# Patient Record
Sex: FEMALE | Race: WHITE | NOT HISPANIC OR LATINO | Employment: OTHER | URBAN - METROPOLITAN AREA
[De-identification: names, ages, dates, MRNs, and addresses within clinical notes are randomized per-mention and may not be internally consistent; named-entity substitution may affect disease eponyms.]

---

## 2022-11-22 ENCOUNTER — OFFICE VISIT (OUTPATIENT)
Dept: PULMONOLOGY | Facility: CLINIC | Age: 62
End: 2022-11-22
Payer: COMMERCIAL

## 2022-11-22 VITALS
BODY MASS INDEX: 25.47 KG/M2 | RESPIRATION RATE: 17 BRPM | WEIGHT: 158.5 LBS | OXYGEN SATURATION: 95 % | SYSTOLIC BLOOD PRESSURE: 120 MMHG | HEIGHT: 66 IN | HEART RATE: 85 BPM | DIASTOLIC BLOOD PRESSURE: 70 MMHG

## 2022-11-22 DIAGNOSIS — J44.89 ASTHMA WITH COPD: Primary | ICD-10-CM

## 2022-11-22 DIAGNOSIS — Z23 NEED FOR 23-POLYVALENT PNEUMOCOCCAL POLYSACCHARIDE VACCINE: ICD-10-CM

## 2022-11-22 DIAGNOSIS — G47.33 OSA ON CPAP: ICD-10-CM

## 2022-11-22 DIAGNOSIS — Z79.899 LONG-TERM USE OF PLAQUENIL: ICD-10-CM

## 2022-11-22 DIAGNOSIS — Z87.891 STOPPED SMOKING WITH GREATER THAN 30 PACK YEAR HISTORY: ICD-10-CM

## 2022-11-22 DIAGNOSIS — M32.9 SYSTEMIC LUPUS ERYTHEMATOSUS, UNSPECIFIED SLE TYPE, UNSPECIFIED ORGAN INVOLVEMENT STATUS: ICD-10-CM

## 2022-11-22 DIAGNOSIS — Z23 NEED FOR IMMUNIZATION AGAINST INFLUENZA: ICD-10-CM

## 2022-11-22 PROCEDURE — 3008F PR BODY MASS INDEX (BMI) DOCUMENTED: ICD-10-PCS | Mod: CPTII,S$GLB,, | Performed by: INTERNAL MEDICINE

## 2022-11-22 PROCEDURE — 90686 FLU VACCINE (QUAD) GREATER THAN OR EQUAL TO 3YO PRESERVATIVE FREE IM: ICD-10-PCS | Mod: S$GLB,,, | Performed by: INTERNAL MEDICINE

## 2022-11-22 PROCEDURE — 90732 PPSV23 VACC 2 YRS+ SUBQ/IM: CPT | Mod: S$GLB,,, | Performed by: INTERNAL MEDICINE

## 2022-11-22 PROCEDURE — 3078F DIAST BP <80 MM HG: CPT | Mod: CPTII,S$GLB,, | Performed by: INTERNAL MEDICINE

## 2022-11-22 PROCEDURE — 3074F SYST BP LT 130 MM HG: CPT | Mod: CPTII,S$GLB,, | Performed by: INTERNAL MEDICINE

## 2022-11-22 PROCEDURE — 99204 OFFICE O/P NEW MOD 45 MIN: CPT | Mod: 25,S$GLB,, | Performed by: INTERNAL MEDICINE

## 2022-11-22 PROCEDURE — 90471 FLU VACCINE (QUAD) GREATER THAN OR EQUAL TO 3YO PRESERVATIVE FREE IM: ICD-10-PCS | Mod: S$GLB,,, | Performed by: INTERNAL MEDICINE

## 2022-11-22 PROCEDURE — 3078F PR MOST RECENT DIASTOLIC BLOOD PRESSURE < 80 MM HG: ICD-10-PCS | Mod: CPTII,S$GLB,, | Performed by: INTERNAL MEDICINE

## 2022-11-22 PROCEDURE — 3008F BODY MASS INDEX DOCD: CPT | Mod: CPTII,S$GLB,, | Performed by: INTERNAL MEDICINE

## 2022-11-22 PROCEDURE — 99204 PR OFFICE/OUTPT VISIT, NEW, LEVL IV, 45-59 MIN: ICD-10-PCS | Mod: 25,S$GLB,, | Performed by: INTERNAL MEDICINE

## 2022-11-22 PROCEDURE — 3074F PR MOST RECENT SYSTOLIC BLOOD PRESSURE < 130 MM HG: ICD-10-PCS | Mod: CPTII,S$GLB,, | Performed by: INTERNAL MEDICINE

## 2022-11-22 PROCEDURE — 99999 PR PBB SHADOW E&M-NEW PATIENT-LVL IV: CPT | Mod: PBBFAC,,, | Performed by: INTERNAL MEDICINE

## 2022-11-22 PROCEDURE — 99999 PR PBB SHADOW E&M-NEW PATIENT-LVL IV: ICD-10-PCS | Mod: PBBFAC,,, | Performed by: INTERNAL MEDICINE

## 2022-11-22 PROCEDURE — 1159F PR MEDICATION LIST DOCUMENTED IN MEDICAL RECORD: ICD-10-PCS | Mod: CPTII,S$GLB,, | Performed by: INTERNAL MEDICINE

## 2022-11-22 PROCEDURE — 90472 PNEUMOCOCCAL POLYSACCHARIDE VACCINE 23-VALENT =>2YO SQ IM: ICD-10-PCS | Mod: S$GLB,,, | Performed by: INTERNAL MEDICINE

## 2022-11-22 PROCEDURE — 90686 IIV4 VACC NO PRSV 0.5 ML IM: CPT | Mod: S$GLB,,, | Performed by: INTERNAL MEDICINE

## 2022-11-22 PROCEDURE — 90472 IMMUNIZATION ADMIN EACH ADD: CPT | Mod: S$GLB,,, | Performed by: INTERNAL MEDICINE

## 2022-11-22 PROCEDURE — 1159F MED LIST DOCD IN RCRD: CPT | Mod: CPTII,S$GLB,, | Performed by: INTERNAL MEDICINE

## 2022-11-22 PROCEDURE — 90471 IMMUNIZATION ADMIN: CPT | Mod: S$GLB,,, | Performed by: INTERNAL MEDICINE

## 2022-11-22 PROCEDURE — 90732 PNEUMOCOCCAL POLYSACCHARIDE VACCINE 23-VALENT =>2YO SQ IM: ICD-10-PCS | Mod: S$GLB,,, | Performed by: INTERNAL MEDICINE

## 2022-11-22 RX ORDER — BUDESONIDE AND FORMOTEROL FUMARATE DIHYDRATE 160; 4.5 UG/1; UG/1
AEROSOL RESPIRATORY (INHALATION)
COMMUNITY
Start: 2022-11-07 | End: 2023-02-21 | Stop reason: SDUPTHER

## 2022-11-22 RX ORDER — OMEPRAZOLE 40 MG/1
40 CAPSULE, DELAYED RELEASE ORAL DAILY
COMMUNITY
Start: 2022-11-19

## 2022-11-22 RX ORDER — ATORVASTATIN CALCIUM 10 MG/1
TABLET, FILM COATED ORAL
COMMUNITY
Start: 2022-11-07

## 2022-11-22 RX ORDER — TRAMADOL HYDROCHLORIDE 50 MG/1
50 TABLET ORAL
COMMUNITY
Start: 2022-10-27

## 2022-11-22 RX ORDER — IBUPROFEN 600 MG/1
TABLET ORAL
COMMUNITY

## 2022-11-22 RX ORDER — CARVEDILOL 12.5 MG/1
12.5 TABLET ORAL 2 TIMES DAILY
COMMUNITY
Start: 2022-10-27

## 2022-11-22 RX ORDER — ALBUTEROL SULFATE 90 UG/1
AEROSOL, METERED RESPIRATORY (INHALATION)
COMMUNITY
Start: 2022-10-27 | End: 2023-02-21 | Stop reason: SDUPTHER

## 2022-11-22 RX ORDER — CYCLOBENZAPRINE HCL 10 MG
10 TABLET ORAL NIGHTLY
COMMUNITY
Start: 2022-11-20

## 2022-11-22 RX ORDER — HYDROXYCHLOROQUINE SULFATE 200 MG/1
200 TABLET, FILM COATED ORAL 2 TIMES DAILY
COMMUNITY
Start: 2022-11-16

## 2022-11-22 NOTE — PROGRESS NOTES
Initial Outpatient Pulmonary Evaluation       SUBJECTIVE:     Chief Complaint   Patient presents with    Asthma       History of Present Illness:    Patient is a 62 y.o. female presents to establish care for asthma with COPD, nocturnal hypoxemia, multiple lung nodules.  Used to follow with pulmonology Dr.Hafiza [facility].      Due for CT scan of the chest 6 months from 2022 as per last CT recommendation.    On albuterol p.r.n. and Symbicort 160 mcg 2 puffs twice daily.  Asthma control test questionnaire 18.    Lupus on plaquenil x      Quit smoking  > 30 pack-year smoking history.      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 Negative for fever and chills.   HENT:  Negative for nosebleeds.    Eyes:  Negative for redness.   Respiratory:  Positive for apnea, snoring, cough, shortness of breath, wheezing, dyspnea on extertion and use of rescue inhaler. Negative for choking.    Cardiovascular:  Negative for leg swelling.   Genitourinary:  Negative for hematuria.   Endocrine:  Negative for cold intolerance.    Musculoskeletal:  Negative for gait problem.        Systemic lupus   Gastrointestinal:  Negative for vomiting.   Neurological:  Negative for syncope.   Hematological:  Negative for adenopathy.   Psychiatric/Behavioral:  Positive for sleep disturbance. Negative for confusion.      Review of patient's allergies indicates:   Allergen Reactions    Hydrochlorothiazide Hives and Shortness Of Breath    Pilocarpine Hives, Itching, Rash and Swelling    Sulfa (sulfonamide antibiotics) Hives, Itching and Rash       Current Outpatient Medications   Medication Sig Dispense Refill    albuterol (PROVENTIL/VENTOLIN HFA) 90 mcg/actuation inhaler SMARTSI Puff(s) Via Inhaler PRN      atorvastatin (LIPITOR) 10 MG tablet SMARTSI Tablet(s) By Mouth Every Evening       "carvediloL (COREG) 12.5 MG tablet Take 12.5 mg by mouth 2 (two) times daily.      cyclobenzaprine (FLEXERIL) 10 MG tablet Take 10 mg by mouth every evening.      hydrOXYchloroQUINE (PLAQUENIL) 200 mg tablet Take 200 mg by mouth 2 (two) times daily.      ibuprofen (ADVIL,MOTRIN) 600 MG tablet       omeprazole (PRILOSEC) 40 MG capsule Take 40 mg by mouth once daily.      SYMBICORT 160-4.5 mcg/actuation HFAA Inhale into the lungs.      traMADoL (ULTRAM) 50 mg tablet Take 50 mg by mouth as needed.       No current facility-administered medications for this visit.       Past Medical History:   Diagnosis Date    Asthma     Hypertension      History reviewed. No pertinent surgical history.  Family History   Problem Relation Age of Onset    Hypertension Mother     Heart failure Mother     Hypertension Father     Diabetes Father     Parkinsonism Father      Social History     Tobacco Use    Smoking status: Former     Years: 35.00     Types: Cigarettes    Smokeless tobacco: Never   Substance Use Topics    Alcohol use: Not Currently    Drug use: Never          OBJECTIVE:     Vital Signs (Most Recent)  Vital Signs  Pulse: 85  Resp: 17  SpO2: 95 %  BP: 120/70  Height and Weight  Height: 5' 6" (167.6 cm)  Weight: 71.9 kg (158 lb 8.2 oz)  BSA (Calculated - sq m): 1.83 sq meters  BMI (Calculated): 25.6  Weight in (lb) to have BMI = 25: 154.6]  Wt Readings from Last 2 Encounters:   11/22/22 71.9 kg (158 lb 8.2 oz)         Physical Exam:  Physical Exam   Constitutional: She is oriented to person, place, and time. She appears well-developed and well-nourished. No distress.   HENT:   Head: Normocephalic.   Cardiovascular: Normal rate and regular rhythm.   Pulmonary/Chest: Normal expansion, hyperinflation and effort normal. No stridor. No respiratory distress. She has decreased breath sounds. She exhibits no tenderness.   Abdominal: She exhibits no distension.   Musculoskeletal:         General: No tenderness.      Cervical back: Neck " supple.   Lymphadenopathy:     She has no cervical adenopathy.   Neurological: She is alert and oriented to person, place, and time. Gait normal.   Skin: Skin is warm. No cyanosis. Nails show no clubbing.   Psychiatric: She has a normal mood and affect. Her behavior is normal. Judgment and thought content normal.   Nursing note and vitals reviewed.    Laboratory  No results found for: WBC, RBC, HGB, HCT, MCV, MCH, MCHC, RDW, PLT, MPV, GRAN, LYMPH, MONO, EOS, BASO, EOSINOPHIL, BASOPHIL    BMP  No results found for: NA, K, CL, CO2, BUN, CREATININE, CALCIUM, ANIONGAP, ESTGFRAFRICA, EGFRNONAA, AST, ALT, PROT    No results found for: BNP    No results found for: TSH    No results found for: SEDRATE    No results found for: CRP    No results found for: IGE    No results found for: ASPERGILLUS  No results found for: AFUMIGATUSCL     No results found for: ACE    Diagnostic Results:  I have personally reviewed today the following studies :        ASSESSMENT/PLAN:     Asthma with COPD  -     Complete PFT without bronchodilator; Future  -     CT Chest Lung Screening Low Dose; Future; Expected date: 02/22/2023  -     Stress test, pulmonary; Future    Stopped smoking with greater than 30 pack year history  -     CT Chest Lung Screening Low Dose; Future; Expected date: 02/22/2023    Need for immunization against influenza  -     Influenza - Quadrivalent (PF)    Need for 23-polyvalent pneumococcal polysaccharide vaccine  -     Pneumococcal Polysaccharide Vaccine (23 Valent) (SQ/IM)    EDGAR on CPAP    Long-term use of Plaquenil    Systemic lupus erythematosus, unspecified SLE type, unspecified organ involvement status    Continue albuterol p.r.n. and Symbicort 160 mcg 2 puffs twice daily.      Check PFT with bronchodilator and 6 minute walk test.      Continue CPAP w nasal cushion and oxygen during sleep    Low-dose CT chest    Continue Plaquenil.  Further recommendations to follow up of workup    Further recommendation to follow  above workup      Follow up in about 3 months (around 2/22/2023).    This note was prepared using voice recognition system and is likely to have sound alike errors that may have been overlooked even after proof reading.  Please call me with any questions    Discussed diagnosis, its evaluation, treatment and usual course. All questions answered.    Thank you for the courtesy of participating in the care of this patient    Cathryn Schenider MD

## 2022-12-07 ENCOUNTER — CLINICAL SUPPORT (OUTPATIENT)
Dept: PULMONOLOGY | Facility: CLINIC | Age: 62
End: 2022-12-07
Payer: MEDICARE

## 2022-12-07 ENCOUNTER — HOSPITAL ENCOUNTER (OUTPATIENT)
Dept: RADIOLOGY | Facility: HOSPITAL | Age: 62
Discharge: HOME | End: 2022-12-07
Attending: INTERNAL MEDICINE
Payer: MEDICARE

## 2022-12-07 VITALS — WEIGHT: 157.19 LBS | HEIGHT: 66 IN | BODY MASS INDEX: 25.26 KG/M2

## 2022-12-07 DIAGNOSIS — J44.89 ASTHMA WITH COPD: ICD-10-CM

## 2022-12-07 DIAGNOSIS — Z87.891 STOPPED SMOKING WITH GREATER THAN 30 PACK YEAR HISTORY: ICD-10-CM

## 2022-12-07 LAB
BRPFT: ABNORMAL
DLCO ADJ PRE: 15.5 ML/(MIN*MMHG) (ref 18.19–29.66)
DLCO SINGLE BREATH LLN: 18.19
DLCO SINGLE BREATH PRE REF: 64.8 %
DLCO SINGLE BREATH REF: 23.93
DLCOC SBVA LLN: 3.14
DLCOC SBVA PRE REF: 111.7 %
DLCOC SBVA REF: 4.52
DLCOC SINGLE BREATH LLN: 18.19
DLCOC SINGLE BREATH PRE REF: 64.8 %
DLCOC SINGLE BREATH REF: 23.93
DLCOVA LLN: 3.14
DLCOVA PRE REF: 111.7 %
DLCOVA PRE: 5.05 ML/(MIN*MMHG*L) (ref 3.14–5.89)
DLCOVA REF: 4.52
DLVAADJ PRE: 5.05 ML/(MIN*MMHG*L) (ref 3.14–5.89)
ERV LLN: -16449.19
ERV PRE REF: 80.1 %
ERV REF: 0.81
FEF 25 75 LLN: 1.16
FEF 25 75 PRE REF: 85.5 %
FEF 25 75 REF: 2.3
FEV1 FVC LLN: 67
FEV1 FVC PRE REF: 94.6 %
FEV1 FVC REF: 79
FEV1 LLN: 1.97
FEV1 PRE REF: 93.7 %
FEV1 REF: 2.62
FRCPLETH LLN: 2
FRCPLETH PREREF: 115.5 %
FRCPLETH REF: 2.82
FVC LLN: 2.52
FVC PRE REF: 98.3 %
FVC REF: 3.35
IVC PRE: 1.78 L (ref 2.52–4.18)
IVC SINGLE BREATH LLN: 2.52
IVC SINGLE BREATH PRE REF: 53.1 %
IVC SINGLE BREATH REF: 3.35
MVV LLN: 85
MVV PRE REF: 27.9 %
MVV REF: 100
PEF LLN: 4.75
PEF PRE REF: 71.2 %
PEF REF: 6.57
PRE DLCO: 15.5 ML/(MIN*MMHG) (ref 18.19–29.66)
PRE ERV: 0.65 L (ref -16449.19–16450.81)
PRE FEF 25 75: 1.96 L/S (ref 1.16–3.44)
PRE FET 100: 9.7 SEC
PRE FEV1 FVC: 74.58 % (ref 66.67–91.03)
PRE FEV1: 2.46 L (ref 1.97–3.28)
PRE FRC PL: 3.26 L
PRE FVC: 3.3 L (ref 2.52–4.18)
PRE MVV: 28 L/MIN (ref 85.37–115.51)
PRE PEF: 4.68 L/S (ref 4.75–8.4)
PRE RV: 1.28 L (ref 1.44–2.59)
PRE TLC: 4.57 L (ref 4.31–6.29)
RAW LLN: 3.06
RAW PRE REF: 62.6 %
RAW PRE: 1.91 CMH2O*S/L (ref 3.06–3.06)
RAW REF: 3.06
RV LLN: 1.44
RV PRE REF: 63.3 %
RV REF: 2.02
RVTLC LLN: 30
RVTLC PRE REF: 70.3 %
RVTLC PRE: 27.92 % (ref 30.11–49.29)
RVTLC REF: 40
TLC LLN: 4.31
TLC PRE REF: 86.3 %
TLC REF: 5.3
VA PRE: 3.08 L (ref 5.15–5.15)
VA SINGLE BREATH LLN: 5.15
VA SINGLE BREATH PRE REF: 59.8 %
VA SINGLE BREATH REF: 5.15
VC LLN: 2.52
VC PRE REF: 98.3 %
VC PRE: 3.3 L (ref 2.52–4.18)
VC REF: 3.35
VTGRAWPRE: 3.67 L

## 2022-12-07 PROCEDURE — 94729 DIFFUSING CAPACITY: CPT | Mod: PBBFAC

## 2022-12-07 PROCEDURE — 99999 PR PBB SHADOW E&M-EST. PATIENT-LVL I: ICD-10-PCS | Mod: PBBFAC,,,

## 2022-12-07 PROCEDURE — 99211 OFF/OP EST MAY X REQ PHY/QHP: CPT | Mod: PBBFAC,25

## 2022-12-07 PROCEDURE — 94010 BREATHING CAPACITY TEST: CPT | Mod: PBBFAC

## 2022-12-07 PROCEDURE — 94729 PR C02/MEMBANE DIFFUSE CAPACITY: ICD-10-PCS | Mod: 26,S$PBB,, | Performed by: INTERNAL MEDICINE

## 2022-12-07 PROCEDURE — 71271 CT CHEST LUNG SCREENING LOW DOSE: ICD-10-PCS | Mod: 26,,, | Performed by: RADIOLOGY

## 2022-12-07 PROCEDURE — 94618 PULMONARY STRESS TESTING: CPT | Mod: 26,S$PBB,, | Performed by: INTERNAL MEDICINE

## 2022-12-07 PROCEDURE — 99999 PR PBB SHADOW E&M-EST. PATIENT-LVL I: CPT | Mod: PBBFAC,,,

## 2022-12-07 PROCEDURE — 71271 CT THORAX LUNG CANCER SCR C-: CPT | Mod: 26,,, | Performed by: RADIOLOGY

## 2022-12-07 PROCEDURE — 71271 CT THORAX LUNG CANCER SCR C-: CPT | Mod: TC

## 2022-12-07 PROCEDURE — 94729 DIFFUSING CAPACITY: CPT | Mod: 26,S$PBB,, | Performed by: INTERNAL MEDICINE

## 2022-12-07 PROCEDURE — 94726 PLETHYSMOGRAPHY LUNG VOLUMES: CPT | Mod: 26,S$PBB,, | Performed by: INTERNAL MEDICINE

## 2022-12-07 PROCEDURE — 94010 BREATHING CAPACITY TEST: ICD-10-PCS | Mod: 26,S$PBB,59, | Performed by: INTERNAL MEDICINE

## 2022-12-07 PROCEDURE — 94010 BREATHING CAPACITY TEST: CPT | Mod: 26,S$PBB,59, | Performed by: INTERNAL MEDICINE

## 2022-12-07 PROCEDURE — 94618 PULMONARY STRESS TESTING: ICD-10-PCS | Mod: 26,S$PBB,, | Performed by: INTERNAL MEDICINE

## 2022-12-07 PROCEDURE — 94726 PLETHYSMOGRAPHY LUNG VOLUMES: CPT | Mod: PBBFAC

## 2022-12-07 PROCEDURE — 94618 PULMONARY STRESS TESTING: CPT | Mod: PBBFAC

## 2022-12-07 PROCEDURE — 94726 PULM FUNCT TST PLETHYSMOGRAP: ICD-10-PCS | Mod: 26,S$PBB,, | Performed by: INTERNAL MEDICINE

## 2022-12-07 NOTE — PROCEDURES
"[facility]  Six Minute Walk     SUMMARY     Ordering Provider:    Interpreting Provider:   Performing nurse/tech/RT: BAYRON Pineda RRT  Diagnosis:  (Asthma with COPD)  Height: 5' 6" (167.6 cm)  Weight: 71.3 kg (157 lb 3 oz)  BMI (Calculated): 25.4   Patient Race:             Phase Oxygen Assessment Supplemental O2 Heart   Rate Blood Pressure Nick Dyspnea Scale Rating   Resting 97 % Room Air 64 bpm 126/58 1   Exercise        Minute        1 97 % Room Air 83 bpm     2 96 % Room Air 86 bpm     3 97 % Room Air 87 bpm     4 97 % Room Air 87 bpm     5 96 % Room Air 89 bpm     6  97 % Room Air 84 bpm 119/54 1   Recovery        Minute        1 98 % Room Air 77 bpm     2 98 % Room Air 68 bpm     3 97 % Room Air 68 bpm     4 98 % Room Air 65 bpm 118/51 0     Six Minute Walk Summary  6MWT Status: completed without stopping  Patient Reported: Dyspnea, Leg pain (Back Pain)     Interpretation:  Did the patient stop or pause?: No                                         Total Time Walked (Calculated): 360 seconds  Final Partial Lap Distance (feet): 100 feet  Total Distance Meters (Calculated): 396.24 meters  Predicted Distance Meters (Calculated): 504.78 meters  Percentage of Predicted (Calculated): 78.5  Peak VO2 (Calculated): 15.87  Mets: 4.53  Has The Patient Had a Previous Six Minute Walk Test?: No       Previous 6MWT Results  Has The Patient Had a Previous Six Minute Walk Test?: No    "

## 2022-12-09 DIAGNOSIS — Z87.891 STOPPED SMOKING WITH GREATER THAN 30 PACK YEAR HISTORY: Primary | ICD-10-CM

## 2023-01-04 DIAGNOSIS — M54.12 RADICULOPATHY, CERVICAL: ICD-10-CM

## 2023-01-04 DIAGNOSIS — M54.16 RADICULOPATHY, LUMBAR REGION: Primary | ICD-10-CM

## 2023-01-05 ENCOUNTER — CLINICAL SUPPORT (OUTPATIENT)
Dept: REHABILITATION | Facility: HOSPITAL | Age: 63
End: 2023-01-05
Payer: MEDICARE

## 2023-01-05 DIAGNOSIS — M54.16 RADICULOPATHY, LUMBAR REGION: Primary | ICD-10-CM

## 2023-01-05 PROCEDURE — 97110 THERAPEUTIC EXERCISES: CPT

## 2023-01-05 PROCEDURE — 97162 PT EVAL MOD COMPLEX 30 MIN: CPT

## 2023-01-05 NOTE — PROGRESS NOTES
"[facility]  Physical Therapy Initial Evaluation    Name: Kendra Hodges  Clinic Number: [id number]    Therapy Diagnosis:Low back and neck pain, core weakness, decreased functional ROM  Physician: Order, Paper    Physician Orders: Evaluate and treat; Bernice Back Program, Lumbar stabilization core strengthening, cervical stabilization  Medical Diagnosis from Referral: Radiculopathy, lumbar region, Radiculopathy, cervical region  Evaluation Date: 1/5/2023  Authorization Period Expiration: medically necessary  Plan of Care Expiration: 03/30/2023  Visit # / Visits authorized: medically necessary    Time In: 1045  Time Out: 1145  Total Appointment Time (timed & untimed codes): 45 minutes  Total Treatment time (time-based codes) separate from Evaluation: 15 minutes    Surgery: none  Orthopedic Precautions:  none  Pertinent History: Lupus    Subjective     Kendra reports: she started having lumbar and cervical pain back in 2012. She denies any injury but reports hard work over the years requiring a lot of bending, twisting, lifting and a lot of walking as a possible factor. Over time her pain became worse and in 2016 after reporting that she was hurting in all joints she was diagnosed with Lupus. Currently she reports that her back pain gives her more trouble than her neck pain pain. She used to like to go hiking but now is now longer able to because of her back and knees. Prolong walking makes her hurt so she is not able to walk much. She describes a "burning" pain across the low back that is mostly constant but varies in intensity depending on what she does. S\he will get spasms. If she does too much she will get sharp "jabbing" pain in her back. She will get radiation of pain into the L thigh most of the time but on rare occasions in the the R thigh. She will experience numbness and tingling again mostly the left leg but rare occasions the right. She also is experiencing constant neck pain " "that radiates into the left upper arm and shoulder. With ROM feels a "popping" in her neck. Denies and radiation of pain into the right arm. Will get numbness and tingling into both and she will wake up at night with numbness and tingling into both hands. She states sometimes changing positions with alleviate it. Her back though will keep her up at times. Mornings are not too bad where she wakes up feeling stiff but once she gets to moving around she will start hurting again. All symptoms worsen with activity, prolong activity and prolonged positions. There is limited improvement with rest, heat and meds .    Reason for visit: low back and cervical pain  Onset time and any changes: gradual worsening since 2012  Pain level and location: 7/10 low back pain and 5/10 neck pain  Radiate proximal or distal: yes; left leg and bilateral upper extremities  Describe pain: aching, dull, stabbing, burning, and stiffness  Numbness/tingling: yes  Agg factors: Sitting, Standing, Laying, Bending, and Walkingsitting, standing, laying, bending, walking, and morning  Ease factors rest, heat ,meds  Sleeping position: any position but has to move a lot  Worse when: during the day  Functional Limitations: unable to perform some ADL's, has to avoid physical activity  Goals: get rid of or control pain to improve quality of life  Prior therapy/intervention: no      Medical History:   Past Medical History:   Diagnosis Date    Asthma     Hypertension        Surgical History:   Kendra Hodges  has no past surgical history on file.    Medications:   Kendra has a current medication list which includes the following prescription(s): albuterol, atorvastatin, carvedilol, cyclobenzaprine, hydroxychloroquine, ibuprofen, omeprazole, symbicort, and tramadol.    Allergies:   Review of patient's allergies indicates:   Allergen Reactions    Hydrochlorothiazide Hives and Shortness Of Breath    Pilocarpine Hives, Itching, Rash and Swelling    Sulfa " (sulfonamide antibiotics) Hives, Itching and Rash        Imaging, MRI studies:   1. Axial lower back pain consistent with internal disc disruption at L4-5 vs L3-4 vs L5-S1 vs facet joint pain at    L4-5 vs L3-4 vs L5-S1.    2. Left lower limb pain consistent with somatic referral from #1 vs L5 vs L4 vs S1 radiculitis  3. Axial  neck pain consistent with internal disc disruption at c5-6 vs C6-7 vs C4-5 vs bilateral facet pain C5-6      Prior Therapy: none  Social History: alone  Prior Level of Function: independent  Current Level of Function: limited with ADL's and any physical activity      Outcome Measure:  CMS Impairment/Limitation/Restriction for FOTO Survey  Therapist reviewed FOTO scores for Kendra Hodges on 2023.   FOTO documents entered into [employer] - see Media section.     Eval Patient's Physical FS Primary Measure: 42  Eval Risk Adjusted Statistical FOTO: 41  Eval Limitation Score: 58%  Category: Mobility  Eval MDQ: 46.1% disability    Objective     Posture: Fair with mild thoracic kyphosis, slouched sitting posture; mildly  lordosis     Gait Analysis: slightly antalgic with small limp with left weight bearing; no assistive device      Palpation     Lumbar:  Right -no ttp     Left-significant tender to moderate palpatory pressure at L4--L5, L5-S1; tenderness along lumbar paraspinals L>R     Cervical:  Moderate ttp to cervical paraspinals L>R, tender to moderate palpatory pressure upper traps L>R and middle deltoid; tenderness interscapular and periscapular bilaterally         Dermatomes     Intact to light touch BLEs       Reflexes     Clonus: absent bilaterally  Right: patellar: 2+ and Achilles: 2+  Left: patellar: 2+ and Achilles: 1+      Myotomes     HIP FLX - Right 5/5; Left 4+/5   HIP ABD - Right 5/5, Left 5/5  HIP ADD - Right 5/5, Left 5/5   KNEE FLX - Right 5/5; Left 4+/5   KNEE EXT - Right 5/5; Left 4+/5   ANKLE DF - Right 5/5, Left 5/5  ANKLE PF - Right 5/5, Left 5/5  GREAT TOE EXT -  "Right 5/5, Left 5/5     SHLD ELEVATION - 5/5 right: 4+/5 left  FLX - 5/5 right, 4+/5 left  SCAPTION -5/5 right, 4+5 left  ABD - 4+/5 right, 4+/5 left  ER - 5/5 right, 5/5 left  IR - 5/5 right, 4+/5 left  ELBOW FLX - 5/5 right, 5/5 left  ELBOW EXT - 5/5 right, 5/5 left  : 5/5 right; 5/5 left      Hip/SI Clearance     Right - CATRACHITA; neg and LOGROLL; neg     Left - CATRACHITA; neg and LOGROLL; neg     Hamstrings tight bilaterally   AROM     Cervical spine moderately limited L side bending with pain L neck and L upper trap pain; moderately limited R side bending with L neck "popping" but no pain; mild limitation L rotation without reproduction of pain; R rotation moderately limited without pain, Flexion mild limitation without pain; Extension severely limited with guarding and posterior cervical pain     Lumbar spine moderately limited flexion with pain across lower spine; mild to severely limited with extension with reproduction of pain and reports "pinching and jabbing", moderately limited with both R & L side bending without reproduction of pain but feels pressure with L with "popping"; mildly limited with R & L rotation without reproduction of pain but feels "pulling and burning" with R rotation.     No reproduction of LE signs or symptoms during testing     Shoulder ROM complete bilaterally but "catching" at times on the left in no consistent pattern     Right - Loredo-Alberto negative and Neer Impingement negative     Left - Loredo-Alberto negative and Neer Impingement negative               Passive Accessory     -Cervical: pain with left lateral glides and overall decreased mobility     -Lumbar: pain with central and left PA's at L5-S1      Special Tests     Slump; increased back pain  SLR Right; increased back pain @ 70 deg  SLR Left; increased back pain @ 60 deg  90/90 Hamstring; positive bilateral  Piriformis Test; positive bilateral     Spurling's: negative          TREATMENT     Kendra received the " treatments listed below:       Time Activities   Manual     TherAct     TherEx 15 HEP; knee to chest, double knee to chest, LTR, bridging, Chin tucks   Gait     Neuro Re-ed     Modalities     E-Stim     Dry Needling     Canalith Repositioning         Home Exercises and Patient Education Provided    Education provided:   -Plan of care, HEP     Written Home Exercises Provided: yes.  Exercises were reviewed and Kendra was able to demonstrate them prior to the end of the session. Kendra demonstrated good understanding of the education provided.     Assessment   Kendra is a 62 y.o. female referred to outpatient Physical Therapy with a medical diagnosis of Radiculopathy, lumbar region, Radiculopathy, cervical region. Patient presents with low back and cervical pain with spasms and guarding, bilateral upper trap pain, extremity pain and numbness, limited lumbar and cervical ROM, limited extremity ROM, limited core strength and extremity weakness resulting in decreased functional capacity and quality of life. Patient will benefit from skilled outpatient Physical Therapy to address the deficits stated above, provide education, and to maximize patient's level of independence.     Patient prognosis is Good.     Plan of care discussed with patient: Yes  Patient's spiritual, cultural and educational needs considered and patient is agreeable to the plan of care and goals as stated below:     Anticipated Barriers for therapy: none    Goals:  Short Term Goals: 6 weeks   Patient will report at least 10% disability reduction from initial MDQ score to indicate clinically significant functional improvement  Patient will report at least 10 point increase on initial FOTO score to indicate clinically significant functional improvement  Patient will demonstrate independence with home exercise program     Long Term Goals: 12 weeks   Patient will report at least 20% disability reduction from initial MDQ score to indicate clinically  significant functional improvement  Patient will report at least 20 point increase on initial FOTO score to indicate clinically significant functional improvement  3.   Patient will return to performance of most activites and ADL and report pain 2/10 or less    Plan   Plan of care Certification: 1/5/2023 to 03/30/2023.       Outpatient Physical Therapy 2-3 times weekly for 12 weeks to include the following interventions: Cervical/Lumbar Traction, Gait Training, Manual Therapy, Moist Heat/ Ice, Neuromuscular Re-ed, Patient Education, Self Care, Therapeutic Activities, and Therapeutic Exercise.     Antoine Medina, PT

## 2023-01-10 ENCOUNTER — CLINICAL SUPPORT (OUTPATIENT)
Dept: REHABILITATION | Facility: HOSPITAL | Age: 63
End: 2023-01-10
Payer: MEDICARE

## 2023-01-10 DIAGNOSIS — M54.12 RADICULOPATHY, CERVICAL: ICD-10-CM

## 2023-01-10 DIAGNOSIS — M54.16 RADICULOPATHY, LUMBAR REGION: Primary | ICD-10-CM

## 2023-01-10 PROCEDURE — 97110 THERAPEUTIC EXERCISES: CPT

## 2023-01-10 NOTE — PLAN OF CARE
Physical Therapy Treatment Note     Name: Kendra Hodges  Clinic Number: [id number]    Therapy Diagnosis: Low back and neck pain, core weakness, decreased functional ROM  Physician: Order, Paper    Visit Date: 1/10/2023    Physician Orders: Evaluate and treat; Bernice Back Program, Lumbar stabilization core strengthening, cervical stabilization  Medical Diagnosis from Referral: Radiculopathy, lumbar region, Radiculopathy, cervical region  Evaluation Date: 1/5/2023  Authorization Period Expiration: medically necessary  Plan of Care Expiration: 03/30/2023  Visit # / Visits authorized: 01/medically necessary    Time In: 1052  Time Out: 1157  Total Billable Time: 55 minutes    Surgery: none  Orthopedic Precautions:  none  Pertinent History: Lupus    Subjective     Patient reports: that she is not doing too bad today. Back and neck pain continues and reports it does go up and down. Did her home exercises and reports having no problems. She agrees to PT treatment session.    Response to previous treatment: good    Pain: 6-7/10 back, 6/10 neck  Location: bilateral back, neck      Outcome Measure:  CMS Impairment/Limitation/Restriction for FOTO Survey  Therapist reviewed FOTO scores for Kendra Hodges on 01/05/2023.   FOTO documents entered into [employer] - see Media section.     Eval Patient's Physical FS Primary Measure: 42  Eval Risk Adjusted Statistical FOTO: 41  Eval Limitation Score: 58%  Category: Mobility  Eval MDQ: 46.1% disability     Objective     Kendra received the following treatment:     Time Activities   Manual     TherAct     TherEx 55 Post pelvic tilt, knee to chest, double knee to chest, LTR, bridging, hip abd side lying, chin tucks, levator scap stretch, upper trap stretch, banded horizontal abd, banded rows, banded shld ext   Gait     Neuro Re-ed     Modalities 10 MH to the neck and low back   E-Stim     Dry Needling     Canalith Repositioning           Home Exercises Provided and Patient Education Provided      Education provided:    -Plan of care, HEP     Written Home Exercises Provided: yes.  Exercises were reviewed and Kendra was able to demonstrate them prior to the end of the session. Kendra demonstrated good understanding of the education provided.     Assessment     Kendra is a 62 y.o. female referred to outpatient Physical Therapy with a medical diagnosis of Radiculopathy, lumbar region, Radiculopathy, cervical region.    Kendra was put through targeted exercises for both the cervical and lower back. She tolerated these fairly well. There is limited ROM both cervical and lumbar areas. We will progress as tolerated.     Patient prognosis is Good.      Anticipated barriers to physical therapy: Lupus    Goals: Kendra Is progressing well towards her goals.    Plan     Plan of care Certification: 1/5/2023 to 03/30/2023.     Outpatient Physical Therapy 2-3 times weekly for 12 weeks to include the following interventions: Cervical/Lumbar Traction, Gait Training, Manual Therapy, Moist Heat/ Ice, Neuromuscular Re-ed, Patient Education, Self Care, Therapeutic Activities, and Therapeutic Exercise.     Antoine Medina, PT

## 2023-01-17 ENCOUNTER — CLINICAL SUPPORT (OUTPATIENT)
Dept: REHABILITATION | Facility: HOSPITAL | Age: 63
End: 2023-01-17
Payer: MEDICARE

## 2023-01-17 DIAGNOSIS — M54.16 RADICULOPATHY, LUMBAR REGION: Primary | ICD-10-CM

## 2023-01-17 DIAGNOSIS — M54.12 RADICULOPATHY, CERVICAL: ICD-10-CM

## 2023-01-17 PROCEDURE — 97110 THERAPEUTIC EXERCISES: CPT

## 2023-01-17 NOTE — PLAN OF CARE
Physical Therapy Treatment Note     Name: Kendra Hodges  Clinic Number: [id number]    Therapy Diagnosis: Low back and neck pain, core weakness, decreased functional ROM  Physician: Order, Paper    Visit Date: 1/17/2023    Physician Orders: Evaluate and treat; [facility] Program, Lumbar stabilization core strengthening, cervical stabilization  Medical Diagnosis from Referral: Radiculopathy, lumbar region, Radiculopathy, cervical region  Evaluation Date: 1/5/2023  Authorization Period Expiration: medically necessary  Plan of Care Expiration: 03/30/2023  Visit # / Visits authorized: 01/medically necessary    Time In: 1057  Time Out: 1152  Total Billable Time: 45 minutes    Surgery: none  Orthopedic Precautions:  none  Pertinent History: Lupus    Subjective     Patient reports: that she is not doing too bad today. Back and neck pain continues and reports it does go up and down. Having some numbness left shoulder down to her fingertips. Did her home exercises and reports having no problems. She agrees to PT treatment session.    Response to previous treatment: good    Pain: 6-7/10 back, 6/10 neck  Location: bilateral back, neck      Outcome Measure:  CMS Impairment/Limitation/Restriction for FOTO Survey  Therapist reviewed FOTO scores for Kendra Hodges on 01/05/2023.   FOTO documents entered into [employer] - see Media section.     Eval Patient's Physical FS Primary Measure: 42  Eval Risk Adjusted Statistical FOTO: 41  Eval Limitation Score: 58%  Category: Mobility  Eval MDQ: 46.1% disability     Objective     Kendra received the following treatment:     Time Activities   Manual     TherAct     TherEx 45 Post pelvic tilt, knee to chest, double knee to chest, LTR, bridging, hip abd side lying, chin tucks, levator scap stretch, upper trap stretch, banded horizontal abd, banded rows, banded shld ext   Gait     Neuro Re-ed     Modalities 10 MH to the neck and low back   E-Stim     Dry Needling     Canalith Repositioning            Home Exercises Provided and Patient Education Provided     Education provided:    -Plan of care, HEP     Written Home Exercises Provided: yes.  Exercises were reviewed and Kendra was able to demonstrate them prior to the end of the session. Kendra demonstrated good understanding of the education provided.     Assessment     Kendra is a 62 y.o. female referred to outpatient Physical Therapy with a medical diagnosis of Radiculopathy, lumbar region, Radiculopathy, cervical region.    Kendra was put through targeted exercises for both the cervical and lower back. She tolerated these fairly well. There is limited ROM both cervical and lumbar areas. We will progress as tolerated. Will include some manual neck stretching with mobs and distractions.    Patient prognosis is Good.      Anticipated barriers to physical therapy: Lupus    Goals: Kendra Is progressing well towards her goals.    Plan     Plan of care Certification: 1/5/2023 to 03/30/2023.     Outpatient Physical Therapy 2-3 times weekly for 12 weeks to include the following interventions: Cervical/Lumbar Traction, Gait Training, Manual Therapy, Moist Heat/ Ice, Neuromuscular Re-ed, Patient Education, Self Care, Therapeutic Activities, and Therapeutic Exercise.     Antoine Medina, PT

## 2023-01-19 ENCOUNTER — CLINICAL SUPPORT (OUTPATIENT)
Dept: REHABILITATION | Facility: HOSPITAL | Age: 63
End: 2023-01-19
Payer: MEDICARE

## 2023-01-19 DIAGNOSIS — M54.16 RADICULOPATHY, LUMBAR REGION: Primary | ICD-10-CM

## 2023-01-19 DIAGNOSIS — M54.12 RADICULOPATHY, CERVICAL: ICD-10-CM

## 2023-01-19 PROCEDURE — 97110 THERAPEUTIC EXERCISES: CPT

## 2023-01-19 PROCEDURE — 97140 MANUAL THERAPY 1/> REGIONS: CPT

## 2023-01-19 NOTE — PLAN OF CARE
Physical Therapy Treatment Note     Name: Kendra Hodges  Clinic Number: [id number]    Therapy Diagnosis: Low back and neck pain, core weakness, decreased functional ROM  Physician: Order, Paper    Visit Date: 1/19/2023    Physician Orders: Evaluate and treat; [facility] Program, Lumbar stabilization core strengthening, cervical stabilization  Medical Diagnosis from Referral: Radiculopathy, lumbar region, Radiculopathy, cervical region  Evaluation Date: 1/5/2023  Authorization Period Expiration: medically necessary  Plan of Care Expiration: 03/30/2023  Visit # / Visits authorized: 01/medically necessary    Time In: 1100  Time Out: 1200  Total Billable Time: 50 minutes    Surgery: none  Orthopedic Precautions:  none  Pertinent History: Lupus    Subjective     Patient reports: that she is not doing too bad today. Back and neck pain continues and reports it does go up and down. Still with some numbness mid left lateral arm but only down to elbow. Doing her home exercises and reports having no problems. She agrees to PT treatment session.    Response to previous treatment: good    Pain: 5/10 back, 4/10 neck  Location: bilateral back, neck      Outcome Measure:  CMS Impairment/Limitation/Restriction for FOTO Survey  Therapist reviewed FOTO scores for Kendra Hodges on 01/05/2023.   FOTO documents entered into [employer] - see Media section.     Eval Patient's Physical FS Primary Measure: 42  Eval Risk Adjusted Statistical FOTO: 41  Eval Limitation Score: 58%  Category: Mobility  Eval MDQ: 46.1% disability     Objective     Kendra received the following treatment:     Time Activities   Manual 20 Manual cervical traction, suboccipital release, bilateral neck passive stretch and DTM (upper traps, medial scalenes, levator scap, medial scap), lateral cervical glides, PA cervical glides   TherAct     TherEx 30 Post pelvic tilt, knee to chest, double knee to chest, LTR, bridging, hip abd side lying, chin tucks, levator scap  stretch, upper trap stretch, banded horizontal abd, banded rows, banded shld ext   Gait     Neuro Re-ed     Modalities 10 MH to the neck and low back   E-Stim     Dry Needling     Canalith Repositioning           Home Exercises Provided and Patient Education Provided     Education provided:    -Plan of care, HEP     Written Home Exercises Provided: yes.  Exercises were reviewed and Kendra was able to demonstrate them prior to the end of the session. Kendra demonstrated good understanding of the education provided.     Assessment     Kendra is a 62 y.o. female referred to outpatient Physical Therapy with a medical diagnosis of Radiculopathy, lumbar region, Radiculopathy, cervical region.    Kendra was put through targeted exercises for both the cervical and lower back. She demonstrated tissue tension left upper trap without any real trigger points. Stretching and mobs proved to be beneficial. She  There is limited ROM both cervical and lumbar areas but improving. We will progress as tolerated.    Patient prognosis is Good.      Anticipated barriers to physical therapy: Lupus    Goals: Kendra Is progressing well towards her goals.    Plan     Plan of care Certification: 1/5/2023 to 03/30/2023.     Outpatient Physical Therapy 2-3 times weekly for 12 weeks to include the following interventions: Cervical/Lumbar Traction, Gait Training, Manual Therapy, Moist Heat/ Ice, Neuromuscular Re-ed, Patient Education, Self Care, Therapeutic Activities, and Therapeutic Exercise.     Antoine Medina, PT

## 2023-01-24 ENCOUNTER — CLINICAL SUPPORT (OUTPATIENT)
Dept: REHABILITATION | Facility: HOSPITAL | Age: 63
End: 2023-01-24
Payer: MEDICARE

## 2023-01-24 DIAGNOSIS — M54.12 RADICULOPATHY, CERVICAL: ICD-10-CM

## 2023-01-24 DIAGNOSIS — M54.16 RADICULOPATHY, LUMBAR REGION: Primary | ICD-10-CM

## 2023-01-24 PROCEDURE — 97110 THERAPEUTIC EXERCISES: CPT

## 2023-01-24 PROCEDURE — 97140 MANUAL THERAPY 1/> REGIONS: CPT

## 2023-01-24 NOTE — PLAN OF CARE
Physical Therapy Treatment Note     Name: Kendra Hodges  Clinic Number: [id number]    Therapy Diagnosis: Low back and neck pain, core weakness, decreased functional ROM  Physician: Order, Paper    Visit Date: 1/24/2023    Physician Orders: Evaluate and treat; [facility] Program, Lumbar stabilization core strengthening, cervical stabilization  Medical Diagnosis from Referral: Radiculopathy, lumbar region, Radiculopathy, cervical region  Evaluation Date: 1/5/2023  Authorization Period Expiration: medically necessary  Plan of Care Expiration: 03/30/2023  Visit # / Visits authorized: 04/medically necessary    Time In: 1052  Time Out: 1142  Total Billable Time:  minutes    Surgery: none  Orthopedic Precautions:  none  Pertinent History: Lupus    Subjective     Patient reports: Kendra comes to therapy today reporting having no neck pain today but her back is hurting. She reports pain left lower paraspinals down into the upper gluteal. No mention of numbness mid left lateral arm down to elbow. Doing her home exercises and reports having no problems. She agrees to PT treatment session.    Response to previous treatment: good    Pain: 5/10 back, 4/10 neck  Location: bilateral back, neck      Outcome Measure:  CMS Impairment/Limitation/Restriction for FOTO Survey  Therapist reviewed FOTO scores for Kendra Hodges on 01/05/2023.   FOTO documents entered into [employer] - see Media section.     Eval Patient's Physical FS Primary Measure: 42  Eval Risk Adjusted Statistical FOTO: 41  Eval Limitation Score: 58%  Category: Mobility  Eval MDQ: 46.1% disability     Objective     Kendra received the following treatment:     Time Activities   Manual 15 DTM to left lower lumbar paraspinals; manual stretch to lower lumbar   TherAct     TherEx 30 Post pelvic tilt, knee to chest, double knee to chest, LTR, bridging, hip abd side lying, add ball squeeze, add ball squeeze knee to chest, standing hip extension   Gait     Neuro Re-ed      Modalities 10 MH to the neck and low back   E-Stim     Dry Needling     Canalith Repositioning           Home Exercises Provided and Patient Education Provided     Education provided:    -Plan of care, HEP     Written Home Exercises Provided: yes.  Exercises were reviewed and Kendra was able to demonstrate them prior to the end of the session. Kendra demonstrated good understanding of the education provided.     Assessment     Kendra is a 62 y.o. female referred to outpatient Physical Therapy with a medical diagnosis of Radiculopathy, lumbar region, Radiculopathy, cervical region.    Kendra was put through targeted exercises for the lower back and she was manually stretched. Lower lumbar ROM is limited bilaterally so we increased her stretches because to does not push when stretching herself. She was much looser following our session. We will continue with the plan care with focus on both lumbar and pelvic stability and flexibility, cervical and upper spine stabilization.    Patient prognosis is Good.      Anticipated barriers to physical therapy: Lupus    Goals: Kendra Is progressing well towards her goals.    Plan     Plan of care Certification: 1/5/2023 to 03/30/2023.     Outpatient Physical Therapy 2-3 times weekly for 12 weeks to include the following interventions: Cervical/Lumbar Traction, Gait Training, Manual Therapy, Moist Heat/ Ice, Neuromuscular Re-ed, Patient Education, Self Care, Therapeutic Activities, and Therapeutic Exercise.     Antoine Medina, PT

## 2023-01-26 ENCOUNTER — CLINICAL SUPPORT (OUTPATIENT)
Dept: REHABILITATION | Facility: HOSPITAL | Age: 63
End: 2023-01-26
Payer: MEDICARE

## 2023-01-26 DIAGNOSIS — M54.16 RADICULOPATHY, LUMBAR REGION: Primary | ICD-10-CM

## 2023-01-26 DIAGNOSIS — M54.12 RADICULOPATHY, CERVICAL: ICD-10-CM

## 2023-01-26 PROCEDURE — 97140 MANUAL THERAPY 1/> REGIONS: CPT

## 2023-01-26 PROCEDURE — 97110 THERAPEUTIC EXERCISES: CPT

## 2023-01-26 NOTE — PLAN OF CARE
Physical Therapy Treatment Note     Name: Kendra Hodges  Clinic Number: [id number]    Therapy Diagnosis: Low back and neck pain, core weakness, decreased functional ROM  Physician: Order, Paper    Visit Date: 1/26/2023    Physician Orders: Evaluate and treat; [facility] Program, Lumbar stabilization core strengthening, cervical stabilization  Medical Diagnosis from Referral: Radiculopathy, lumbar region, Radiculopathy, cervical region  Evaluation Date: 1/5/2023  Authorization Period Expiration: medically necessary  Plan of Care Expiration: 03/30/2023  Visit # / Visits authorized: 04/medically necessary    Time In: 1100  Time Out: 1155  Total Billable Time:  45 minutes    Surgery: none  Orthopedic Precautions:  none  Pertinent History: Lupus    Subjective     Patient reports: Kendra comes to therapy today reporting having no neck or left arm pain but her back continues to hurt. Started hurting in the low back and left hip area when out walking he dog. Doing her home exercises and reports having no problems. She agrees to PT treatment session.    Response to previous treatment: good    Pain: 5/10 back, 0/10 neck  Location: bilateral back, neck      Outcome Measure:  CMS Impairment/Limitation/Restriction for FOTO Survey  Therapist reviewed FOTO scores for Kendra Hodges on 01/05/2023.   FOTO documents entered into [employer] - see Media section.     Eval Patient's Physical FS Primary Measure: 42  Eval Risk Adjusted Statistical FOTO: 41  Eval Limitation Score: 58%  Category: Mobility  Eval MDQ: 46.1% disability     Objective     Kendra received the following treatment:     Time Activities   Manual 15 DTM to left lower lumbar paraspinals; manual stretch to lower lumbar   TherAct     TherEx 30 Post pelvic tilt, knee to chest, double knee to chest, LTR, bridging, hip abd side lying, add ball squeeze, add ball squeeze knee to chest, standing hip extension   Gait     Neuro Re-ed     Modalities 10 MH to the neck and low back    E-Stim     Dry Needling     Canalith Repositioning           Home Exercises Provided and Patient Education Provided     Education provided:    -Plan of care, HEP     Written Home Exercises Provided: yes.  Exercises were reviewed and Kendra was able to demonstrate them prior to the end of the session. Kendra demonstrated good understanding of the education provided.     Assessment     Kendra is a 62 y.o. female referred to outpatient Physical Therapy with a medical diagnosis of Radiculopathy, lumbar region, Radiculopathy, cervical region.    Kendra was put through targeted exercises for the lower back and she was manually stretched. Lower lumbar ROM is limited bilaterally so we increased her stretches because she is not able to push as much when stretching herself. She was much looser following our session. We will continue with the plan care with focus on both lumbar and pelvic stability and flexibility, cervical and upper spine stabilization.    Patient prognosis is Good.      Anticipated barriers to physical therapy: Lupus    Goals: eKndra Is progressing well towards her goals.    Plan     Plan of care Certification: 1/5/2023 to 03/30/2023.     Outpatient Physical Therapy 2-3 times weekly for 12 weeks to include the following interventions: Cervical/Lumbar Traction, Gait Training, Manual Therapy, Moist Heat/ Ice, Neuromuscular Re-ed, Patient Education, Self Care, Therapeutic Activities, and Therapeutic Exercise.     Antoine Medina, PT

## 2023-02-07 ENCOUNTER — CLINICAL SUPPORT (OUTPATIENT)
Dept: REHABILITATION | Facility: HOSPITAL | Age: 63
End: 2023-02-07
Payer: MEDICARE

## 2023-02-07 DIAGNOSIS — M54.12 RADICULOPATHY, CERVICAL: ICD-10-CM

## 2023-02-07 DIAGNOSIS — M54.16 RADICULOPATHY, LUMBAR REGION: Primary | ICD-10-CM

## 2023-02-07 PROCEDURE — 97110 THERAPEUTIC EXERCISES: CPT

## 2023-02-07 PROCEDURE — 97140 MANUAL THERAPY 1/> REGIONS: CPT

## 2023-02-07 NOTE — PLAN OF CARE
Physical Therapy Treatment Note     Name: Kendra Hodges  Clinic Number: [id number]    Therapy Diagnosis: Low back and neck pain, core weakness, decreased functional ROM  Physician: Order, Paper    Visit Date: 2/7/2023    Physician Orders: Evaluate and treat; [facility] Program, Lumbar stabilization core strengthening, cervical stabilization  Medical Diagnosis from Referral: Radiculopathy, lumbar region, Radiculopathy, cervical region  Evaluation Date: 1/5/2023  Authorization Period Expiration: medically necessary  Plan of Care Expiration: 03/30/2023  Visit # / Visits authorized: 06/medically necessary    Time In: 1100  Time Out: 1159  Total Billable Time:  45 minutes    Surgery: none  Orthopedic Precautions:  none  Pertinent History: Lupus    Subjective     Patient reports: Kendra comes to therapy today reporting having no neck or left arm pain but her back continues to hurt. Started hurting in the low back and left hip area when out walking he dog. Doing her home exercises and reports having no problems. She agrees to PT treatment session.    Response to previous treatment: good    Pain: 3/10 back, 6/10 neck  Location: bilateral back, neck      Outcome Measure:  CMS Impairment/Limitation/Restriction for FOTO Survey  Therapist reviewed FOTO scores for Kendra Hodges on 01/05/2023.   FOTO documents entered into [employer] - see Media section.     Eval Patient's Physical FS Primary Measure: 42  Eval Risk Adjusted Statistical FOTO: 41  Eval Limitation Score: 58%  Category: Mobility  Eval MDQ: 46.1% disability     2/07/2023: Physical FS Primary Measure: 42  2/07/2023: Limitation Score: 58%  2/07/2023: MDQ: 46.1% disability     Objective     Kendra received the following treatment:     Time Activities   Manual 30 Manual cervical traction, suboccipital release, bilateral neck passive stretch and DTM (upper traps, medial scalenes, levator scap, medial scap), lateral cervical glides, PA's to cervical and upper thoracic,  cervical glides   TherAct     TherEx 20 Post pelvic tilt, knee to chest, double knee to chest, LTR, bridging, seated trunk flex   Gait     Neuro Re-ed     Modalities 10 MH to the neck and low back   E-Stim     Dry Needling     Canalith Repositioning           Home Exercises Provided and Patient Education Provided     Education provided:    -Plan of care, HEP     Written Home Exercises Provided: yes.  Exercises were reviewed and Kendra was able to demonstrate them prior to the end of the session. Kendra demonstrated good understanding of the education provided.     Assessment     Kendra is a 62 y.o. female referred to outpatient Physical Therapy with a medical diagnosis of Radiculopathy, lumbar region, Radiculopathy, cervical region.    Kendra comes to therapy today reporting more cervical pain than lumber. We did more manual work with the neck area today and this was beneficial. Lower lumbar ROM remains limited bilaterally so we performed manual stretches because she is not able to push as much when stretching herself. She was much looser following our session. We will continue with the plan care with focus on both lumbar and pelvic stability and flexibility, cervical and upper spine stabilization.    Patient prognosis is Good.      Anticipated barriers to physical therapy: Lupus    Goals: Kendra Is progressing well towards her goals.    Plan     Plan of care Certification: 1/5/2023 to 03/30/2023.     Outpatient Physical Therapy 2-3 times weekly for 12 weeks to include the following interventions: Cervical/Lumbar Traction, Gait Training, Manual Therapy, Moist Heat/ Ice, Neuromuscular Re-ed, Patient Education, Self Care, Therapeutic Activities, and Therapeutic Exercise.     Antoine Medina, PT

## 2023-02-09 ENCOUNTER — CLINICAL SUPPORT (OUTPATIENT)
Dept: REHABILITATION | Facility: HOSPITAL | Age: 63
End: 2023-02-09
Payer: MEDICARE

## 2023-02-09 DIAGNOSIS — M54.12 RADICULOPATHY, CERVICAL: ICD-10-CM

## 2023-02-09 DIAGNOSIS — M54.16 RADICULOPATHY, LUMBAR REGION: Primary | ICD-10-CM

## 2023-02-09 PROCEDURE — 97140 MANUAL THERAPY 1/> REGIONS: CPT

## 2023-02-09 PROCEDURE — 97110 THERAPEUTIC EXERCISES: CPT

## 2023-02-09 NOTE — PLAN OF CARE
"  Physical Therapy Treatment Note     Name: Kendra Hodges  Clinic Number: [id number]    Therapy Diagnosis: Low back and neck pain, core weakness, decreased functional ROM  Physician: Order, Paper    Visit Date: 2/9/2023    Physician Orders: Evaluate and treat; [facility] Program, Lumbar stabilization core strengthening, cervical stabilization  Medical Diagnosis from Referral: Radiculopathy, lumbar region, Radiculopathy, cervical region  Evaluation Date: 1/5/2023  Authorization Period Expiration: medically necessary  Plan of Care Expiration: 03/30/2023  Visit # / Visits authorized: 09/medically necessary    Time In: 1100  Time Out: 1155  Total Billable Time:  45 minutes    Surgery: none  Orthopedic Precautions:  none  Pertinent History: Lupus    Subjective     Patient reports: Kendra comes to therapy today reporting having no neck or left arm pain but her back continues to hurt and "feels stiff."  She agrees to PT treatment session.    Response to previous treatment: good    Pain: 5/10 back, 0/10 neck  Location: bilateral back, neck      Outcome Measure:  CMS Impairment/Limitation/Restriction for FOTO Survey  Therapist reviewed FOTO scores for Kendra Hodges on 01/05/2023.   FOTO documents entered into [facility] - see Media section.     Eval Patient's Physical FS Primary Measure: 42  Eval Risk Adjusted Statistical FOTO: 41  Eval Limitation Score: 58%  Category: Mobility  Eval MDQ: 46.1% disability     2/07/2023: Physical FS Primary Measure: 42  2/07/2023: Limitation Score: 58%  2/07/2023: MDQ: 46.1% disability     Objective     Kendra received the following treatment:     Time Activities   Manual 15 Piriformis stretch, hamstring stretch, LTR manual   TherAct     TherEx 30 Post pelvic tilt, knee to chest, double knee to chest, LTR, bridging, seated trunk rotation, seated trunk flex,   Gait     Neuro Re-ed     Modalities 10 MH to the neck and low back   E-Stim     Dry Needling     Canalith Repositioning           Home " Exercises Provided and Patient Education Provided     Education provided:    -Plan of care, HEP     Written Home Exercises Provided: yes.  Exercises were reviewed and Kendra was able to demonstrate them prior to the end of the session. Kendra demonstrated good understanding of the education provided.     Assessment     Kendra is a 62 y.o. female referred to outpatient Physical Therapy with a medical diagnosis of Radiculopathy, lumbar region, Radiculopathy, cervical region.    Kendra demonstrates significant limitation with lower trunk rotation and and to a lesser degree trunk flexion so we had to manually stretch her because she is not able to fully stretch herself. Added seated rotation and trunk flexion which she tolerated well. She was much looser following our session. We will continue with the plan care with focus on both lumbar and pelvic stability and flexibility, cervical and upper spine stabilization.    Patient prognosis is Good.      Anticipated barriers to physical therapy: Lupus    Goals: Kendra Is progressing well towards her goals.    Plan     Plan of care Certification: 1/5/2023 to 03/30/2023.     Outpatient Physical Therapy 2-3 times weekly for 12 weeks to include the following interventions: Cervical/Lumbar Traction, Gait Training, Manual Therapy, Moist Heat/ Ice, Neuromuscular Re-ed, Patient Education, Self Care, Therapeutic Activities, and Therapeutic Exercise.     Antoine Medina, PT

## 2023-02-14 ENCOUNTER — CLINICAL SUPPORT (OUTPATIENT)
Dept: REHABILITATION | Facility: HOSPITAL | Age: 63
End: 2023-02-14
Payer: MEDICARE

## 2023-02-14 DIAGNOSIS — M54.16 RADICULOPATHY, LUMBAR REGION: Primary | ICD-10-CM

## 2023-02-14 DIAGNOSIS — M54.12 RADICULOPATHY, CERVICAL: ICD-10-CM

## 2023-02-14 PROCEDURE — 97140 MANUAL THERAPY 1/> REGIONS: CPT

## 2023-02-14 PROCEDURE — 97110 THERAPEUTIC EXERCISES: CPT

## 2023-02-14 NOTE — PLAN OF CARE
Physical Therapy Treatment Note     Name: Kendra Hodges  Clinic Number: [id number]    Therapy Diagnosis: Low back and neck pain, core weakness, decreased functional ROM  Physician: Order, Paper    Visit Date: 2/14/2023    Physician Orders: Evaluate and treat; Bernice Back Program, Lumbar stabilization core strengthening, cervical stabilization  Medical Diagnosis from Referral: Radiculopathy, lumbar region, Radiculopathy, cervical region  Evaluation Date: 1/5/2023  Authorization Period Expiration: medically necessary  Plan of Care Expiration: 03/30/2023  Visit # / Visits authorized: 10/medically necessary    Time In: 1059  Time Out: 1154  Total Billable Time:  45 minutes    Surgery: none  Orthopedic Precautions:  none  Pertinent History: Lupus    Subjective     Patient reports: Kendra comes to therapy today with complaints of back pain. Feels that the manual traction at her last visit made it worse. Neck doing ok today. She agrees to PT treatment session.    Response to previous treatment: good    Pain: 6/10 back, 0/10 neck  Location: bilateral back, neck      Outcome Measure:  CMS Impairment/Limitation/Restriction for FOTO Survey  Therapist reviewed FOTO scores for Kendra Hodges on 01/05/2023.   FOTO documents entered into [employer] - see Media section.     Eval Patient's Physical FS Primary Measure: 42  Eval Risk Adjusted Statistical FOTO: 41  Eval Limitation Score: 58%  Category: Mobility  Eval MDQ: 46.1% disability     2/07/2023: Physical FS Primary Measure: 42  2/07/2023: Limitation Score: 58%  2/07/2023: MDQ: 46.1% disability     Objective     Kendra received the following treatment:     Time Activities   Manual 15 Piriformis stretch, hamstring stretch, LTR manual   TherAct     TherEx 30 Post pelvic tilt, knee to chest, double knee to chest, LTR, bridging, seated trunk rotation, seated trunk flex,   Gait     Neuro Re-ed     Modalities 10 MH to the neck and low back   E-Stim     Dry Needling     Canalith  Repositioning           Home Exercises Provided and Patient Education Provided     Education provided:    -Plan of care, HEP     Written Home Exercises Provided: yes.  Exercises were reviewed and Kendra was able to demonstrate them prior to the end of the session. Kendra demonstrated good understanding of the education provided.     Assessment     Kendra is a 62 y.o. female referred to outpatient Physical Therapy with a medical diagnosis of Radiculopathy, lumbar region, Radiculopathy, cervical region.    Kendra remains with significant limitation with lower trunk rotation and and to a lesser degree trunk flexion. We continued with targeted exercises and manually stretched her and she tolerated this fairly well. Continued with seated rotation and trunk flexion. We will continue with the plan care with focus on both lumbar and pelvic stability and flexibility, cervical and upper spine stabilization.    Patient prognosis is Good.      Anticipated barriers to physical therapy: Lupus    Goals: Kendra Is progressing well towards her goals.    Plan     Plan of care Certification: 1/5/2023 to 03/30/2023.     Outpatient Physical Therapy 2-3 times weekly for 12 weeks to include the following interventions: Cervical/Lumbar Traction, Gait Training, Manual Therapy, Moist Heat/ Ice, Neuromuscular Re-ed, Patient Education, Self Care, Therapeutic Activities, and Therapeutic Exercise.     Antoine Medina, PT

## 2023-02-20 ENCOUNTER — PATIENT MESSAGE (OUTPATIENT)
Dept: PULMONOLOGY | Facility: CLINIC | Age: 63
End: 2023-02-20
Payer: MEDICARE

## 2023-02-21 ENCOUNTER — HOSPITAL ENCOUNTER (OUTPATIENT)
Dept: RADIOLOGY | Facility: HOSPITAL | Age: 63
Discharge: HOME | End: 2023-02-21
Attending: INTERNAL MEDICINE
Payer: MEDICARE

## 2023-02-21 ENCOUNTER — OFFICE VISIT (OUTPATIENT)
Dept: PULMONOLOGY | Facility: CLINIC | Age: 63
End: 2023-02-21
Payer: MEDICARE

## 2023-02-21 VITALS
BODY MASS INDEX: 24.41 KG/M2 | HEART RATE: 78 BPM | WEIGHT: 151.88 LBS | HEIGHT: 66 IN | OXYGEN SATURATION: 98 % | DIASTOLIC BLOOD PRESSURE: 64 MMHG | SYSTOLIC BLOOD PRESSURE: 140 MMHG | RESPIRATION RATE: 20 BRPM

## 2023-02-21 DIAGNOSIS — Z87.891 STOPPED SMOKING WITH GREATER THAN 30 PACK YEAR HISTORY: ICD-10-CM

## 2023-02-21 DIAGNOSIS — J44.1 COPD EXACERBATION: Primary | ICD-10-CM

## 2023-02-21 DIAGNOSIS — J44.89 ASTHMA WITH COPD: ICD-10-CM

## 2023-02-21 DIAGNOSIS — G47.33 OSA ON CPAP: ICD-10-CM

## 2023-02-21 DIAGNOSIS — Z79.899 LONG-TERM USE OF PLAQUENIL: ICD-10-CM

## 2023-02-21 DIAGNOSIS — J44.1 COPD EXACERBATION: ICD-10-CM

## 2023-02-21 DIAGNOSIS — M32.9 SYSTEMIC LUPUS ERYTHEMATOSUS, UNSPECIFIED SLE TYPE, UNSPECIFIED ORGAN INVOLVEMENT STATUS: ICD-10-CM

## 2023-02-21 PROCEDURE — 96372 THER/PROPH/DIAG INJ SC/IM: CPT | Mod: PBBFAC

## 2023-02-21 PROCEDURE — 99213 OFFICE O/P EST LOW 20 MIN: CPT | Mod: PBBFAC | Performed by: INTERNAL MEDICINE

## 2023-02-21 PROCEDURE — 99999 PR PBB SHADOW E&M-EST. PATIENT-LVL III: ICD-10-PCS | Mod: PBBFAC,,, | Performed by: INTERNAL MEDICINE

## 2023-02-21 PROCEDURE — 99215 PR OFFICE/OUTPT VISIT, EST, LEVL V, 40-54 MIN: ICD-10-PCS | Mod: S$PBB,,, | Performed by: INTERNAL MEDICINE

## 2023-02-21 PROCEDURE — 71046 X-RAY EXAM CHEST 2 VIEWS: CPT | Mod: TC

## 2023-02-21 PROCEDURE — 99215 OFFICE O/P EST HI 40 MIN: CPT | Mod: S$PBB,,, | Performed by: INTERNAL MEDICINE

## 2023-02-21 PROCEDURE — 71046 XR CHEST PA AND LATERAL: ICD-10-PCS | Mod: 26,,, | Performed by: RADIOLOGY

## 2023-02-21 PROCEDURE — 71046 X-RAY EXAM CHEST 2 VIEWS: CPT | Mod: 26,,, | Performed by: RADIOLOGY

## 2023-02-21 PROCEDURE — 99999 PR PBB SHADOW E&M-EST. PATIENT-LVL III: CPT | Mod: PBBFAC,,, | Performed by: INTERNAL MEDICINE

## 2023-02-21 PROCEDURE — 94640 AIRWAY INHALATION TREATMENT: CPT | Mod: PBBFAC,59

## 2023-02-21 RX ORDER — LEVALBUTEROL INHALATION SOLUTION 1.25 MG/3ML
1 SOLUTION RESPIRATORY (INHALATION) EVERY 4 HOURS PRN
Qty: 360 ML | Refills: 3 | Status: SHIPPED | OUTPATIENT
Start: 2023-02-21 | End: 2023-08-04 | Stop reason: SDUPTHER

## 2023-02-21 RX ORDER — LEVALBUTEROL INHALATION SOLUTION 1.25 MG/3ML
1.25 SOLUTION RESPIRATORY (INHALATION) EVERY 4 HOURS PRN
COMMUNITY
Start: 2023-02-01 | End: 2023-02-21 | Stop reason: SDUPTHER

## 2023-02-21 RX ORDER — ALBUTEROL SULFATE 90 UG/1
2 AEROSOL, METERED RESPIRATORY (INHALATION) EVERY 6 HOURS PRN
Qty: 18 G | Refills: 5 | Status: SHIPPED | OUTPATIENT
Start: 2023-02-21 | End: 2023-08-04 | Stop reason: SDUPTHER

## 2023-02-21 RX ORDER — PREDNISONE 10 MG/1
10 TABLET ORAL DAILY
COMMUNITY
End: 2023-02-21

## 2023-02-21 RX ORDER — TRIAMCINOLONE ACETONIDE 40 MG/ML
40 INJECTION, SUSPENSION INTRA-ARTICULAR; INTRAMUSCULAR
Status: COMPLETED | OUTPATIENT
Start: 2023-02-21 | End: 2023-02-21

## 2023-02-21 RX ORDER — IPRATROPIUM BROMIDE AND ALBUTEROL SULFATE 2.5; .5 MG/3ML; MG/3ML
3 SOLUTION RESPIRATORY (INHALATION)
Status: COMPLETED | OUTPATIENT
Start: 2023-02-21 | End: 2023-02-21

## 2023-02-21 RX ORDER — PREDNISONE 10 MG/1
TABLET ORAL
Qty: 21 TABLET | Refills: 0 | Status: SHIPPED | OUTPATIENT
Start: 2023-02-22 | End: 2023-05-23

## 2023-02-21 RX ORDER — BUDESONIDE AND FORMOTEROL FUMARATE DIHYDRATE 160; 4.5 UG/1; UG/1
2 AEROSOL RESPIRATORY (INHALATION) EVERY 12 HOURS
Qty: 10.2 G | Refills: 5 | Status: SHIPPED | OUTPATIENT
Start: 2023-02-21 | End: 2023-08-04 | Stop reason: SDUPTHER

## 2023-02-21 RX ADMIN — IPRATROPIUM BROMIDE AND ALBUTEROL SULFATE 3 ML: .5; 3 SOLUTION RESPIRATORY (INHALATION) at 02:02

## 2023-02-21 RX ADMIN — TRIAMCINOLONE ACETONIDE 40 MG: 400 INJECTION, SUSPENSION INTRA-ARTICULAR; INTRAMUSCULAR at 02:02

## 2023-02-21 NOTE — PROGRESS NOTES
Pulmonary Outpatient Follow Up Visit     Subjective:       Patient ID: Kendra Hodges is a 63 y.o. female.    Chief Complaint: No chief complaint on file.      HPI        Patient is a 62 y.o. female presents for 3 months follow up.     2/21/2023 AECOPD Jan 2023  Steroids + levaquin BY PMD .  Coughing spells frequent in exam room today.  Congested.  Tested negative back in January for upper respiratory viral infection COVID/flu as per patient.      Low-dose CT scan of the chest showed nodules less than 4 mm scheduled for 1 year surveillance.    PFT DLCO reduction otherwise unremarkable.  6 minute walking test no need for O2 on exertion.      She initially presented in November 2022  to establish care for asthma with COPD, nocturnal hypoxemia, multiple lung nodules.  Used to follow with pulmonology Dr.Hafiza [facility].      Due for CT scan of the chest 6 months from August 2022 as per last CT recommendation.    On albuterol p.r.n. and Symbicort 160 mcg 2 puffs twice daily.  Asthma control test questionnaire 18.today 12     Lupus on plaquenil x 2019     Quit smoking 2020 > 30 pack-year smoking history.    Review of Systems   Constitutional:  Positive for fatigue. Negative for fever and chills.   HENT:  Negative for nosebleeds.    Eyes:  Negative for redness.   Respiratory:  Positive for apnea, snoring, cough, shortness of breath, wheezing, dyspnea on extertion and use of rescue inhaler. Negative for choking.    Cardiovascular:  Negative for leg swelling.   Genitourinary:  Negative for hematuria.   Endocrine:  Negative for cold intolerance.    Musculoskeletal:  Negative for gait problem.        Systemic lupus   Gastrointestinal:  Negative for vomiting.   Neurological:  Negative for syncope.   Hematological:  Negative for adenopathy.   Psychiatric/Behavioral:  Positive for sleep disturbance. Negative for confusion.      Outpatient Encounter  Medications as of 2023   Medication Sig Dispense Refill    atorvastatin (LIPITOR) 10 MG tablet SMARTSI Tablet(s) By Mouth Every Evening      carvediloL (COREG) 12.5 MG tablet Take 12.5 mg by mouth 2 (two) times daily.      cyclobenzaprine (FLEXERIL) 10 MG tablet Take 10 mg by mouth every evening.      hydrOXYchloroQUINE (PLAQUENIL) 200 mg tablet Take 200 mg by mouth 2 (two) times daily.      ibuprofen (ADVIL,MOTRIN) 600 MG tablet       omeprazole (PRILOSEC) 40 MG capsule Take 40 mg by mouth once daily.      traMADoL (ULTRAM) 50 mg tablet Take 50 mg by mouth as needed.      [DISCONTINUED] albuterol (PROVENTIL/VENTOLIN HFA) 90 mcg/actuation inhaler SMARTSI Puff(s) Via Inhaler PRN      [DISCONTINUED] levalbuterol (XOPENEX) 1.25 mg/3 mL nebulizer solution Inhale 1.25 mg into the lungs every 4 (four) hours as needed.      [DISCONTINUED] SYMBICORT 160-4.5 mcg/actuation HFAA Inhale into the lungs.      albuterol (PROVENTIL/VENTOLIN HFA) 90 mcg/actuation inhaler Inhale 2 puffs into the lungs every 6 (six) hours as needed for Wheezing or Shortness of Breath. Rescue 18 g 5    levalbuterol (XOPENEX) 1.25 mg/3 mL nebulizer solution Take 3 mLs (1.25 mg total) by nebulization every 4 (four) hours as needed for Wheezing or Shortness of Breath. 360 mL 3    [START ON 2023] predniSONE (DELTASONE) 10 MG tablet Prednisone 40 mg daily for 3 days then 20 mg daily for 3 days then 10 mg daily for 3 days 21 tablet 0    SYMBICORT 160-4.5 mcg/actuation HFAA Inhale 2 puffs into the lungs every 12 (twelve) hours. 10.2 g 5    [DISCONTINUED] predniSONE (DELTASONE) 10 MG tablet Take 10 mg by mouth once daily.       Facility-Administered Encounter Medications as of 2023   Medication Dose Route Frequency Provider Last Rate Last Admin    [COMPLETED] albuterol-ipratropium 2.5 mg-0.5 mg/3 mL nebulizer solution 3 mL  3 mL Nebulization 1 time in Clinic/HOD Cathryn Schneider MD   3 mL at 23 1447    [COMPLETED] triamcinolone  "acetonide injection 40 mg  40 mg Intramuscular 1 time in Clinic/HOD Cathryn Schneider MD   40 mg at 02/21/23 1447       Objective:     Vital Signs (Most Recent)  Vital Signs  Pulse: 78  Resp: 20  SpO2: 98 %  BP: (!) 140/64  Pain Score:   6  Pain Loc: Abdomen  Height and Weight  Height: 5' 6" (167.6 cm)  Weight: 68.9 kg (151 lb 14.4 oz)  BSA (Calculated - sq m): 1.79 sq meters  BMI (Calculated): 24.5  Weight in (lb) to have BMI = 25: 154.6]  Wt Readings from Last 2 Encounters:   02/21/23 68.9 kg (151 lb 14.4 oz)   12/07/22 71.3 kg (157 lb 3 oz)       Physical Exam   Constitutional: She is oriented to person, place, and time. She appears well-developed and well-nourished. No distress.   HENT:   Head: Normocephalic.   Cardiovascular: Normal rate and regular rhythm.   Pulmonary/Chest: Normal expansion, hyperinflation and effort normal. No stridor. No respiratory distress. She has decreased breath sounds. She has rhonchi. She exhibits no tenderness.   Abdominal: She exhibits no distension.   Musculoskeletal:         General: No tenderness.      Cervical back: Neck supple.   Lymphadenopathy:     She has no cervical adenopathy.   Neurological: She is alert and oriented to person, place, and time. Gait normal.   Skin: Skin is warm. No cyanosis. Nails show no clubbing.   Psychiatric: She has a normal mood and affect. Her behavior is normal. Judgment and thought content normal.   Nursing note and vitals reviewed.    Laboratory  No results found for: WBC, RBC, HGB, HCT, MCV, MCH, MCHC, RDW, PLT, MPV, GRAN, LYMPH, MONO, EOS, BASO, EOSINOPHIL, BASOPHIL    BMP  No results found for: NA, K, CL, CO2, BUN, CREATININE, CALCIUM, ANIONGAP, ESTGFRAFRICA, EGFRNONAA, AST, ALT, PROT    No results found for: BNP    No results found for: TSH    No results found for: SEDRATE    No results found for: CRP  No results found for: IGE     No results found for: ASPERGILLUS  No results found for: AFUMIGATUSCL     No results found for: ACE "     Diagnostic Results:  I have personally reviewed today the following studies:        Ldct 12/2022    FINDINGS:  Lungs: There are no abnormal opacities that require further evaluation.  There are small bilateral pleural and parenchymal pulmonary nodules measuring 2-4 mm in size.  No focal consolidation.  No significant changes of emphysema.     Pleura:   No effusion..     Heart and pericardium: Normal size without effusion.     Aorta and vasculature: Atherosclerosis including coronary arteries.     Chest wall and skeletal structures: Unremarkable except age-appropriate degenerative changes.     Upper abdomen: Prominent calcified gallstone measuring 1.7 cm.     Impression:     Lung-RADS Category:  2 - Benign Appearance or Behavior - continue annual screening with LDCT in 12 months.      Assessment/Plan:   COPD exacerbation  -     albuterol-ipratropium 2.5 mg-0.5 mg/3 mL nebulizer solution 3 mL  -     triamcinolone acetonide injection 40 mg  -     predniSONE (DELTASONE) 10 MG tablet; Prednisone 40 mg daily for 3 days then 20 mg daily for 3 days then 10 mg daily for 3 days  Dispense: 21 tablet; Refill: 0  -     IgE; Future; Expected date: 02/21/2023  -     X-Ray Chest PA And Lateral; Future; Expected date: 02/21/2023    Asthma with COPD  -     albuterol (PROVENTIL/VENTOLIN HFA) 90 mcg/actuation inhaler; Inhale 2 puffs into the lungs every 6 (six) hours as needed for Wheezing or Shortness of Breath. Rescue  Dispense: 18 g; Refill: 5  -     SYMBICORT 160-4.5 mcg/actuation HFAA; Inhale 2 puffs into the lungs every 12 (twelve) hours.  Dispense: 10.2 g; Refill: 5  -     levalbuterol (XOPENEX) 1.25 mg/3 mL nebulizer solution; Take 3 mLs (1.25 mg total) by nebulization every 4 (four) hours as needed for Wheezing or Shortness of Breath.  Dispense: 360 mL; Refill: 3    Long-term use of Plaquenil    EDGAR on CPAP    Stopped smoking with greater than 30 pack year history    Systemic lupus erythematosus, unspecified SLE type,  unspecified organ involvement status      Patient's symptoms out of proportion to her PFT findings and CT scan of the chest.      Rule out extrinsic asthma.  Check IgE.  Check chest x-ray today.    IM Kenalog plus nebulizer in the exam room today.  Advised patient that if no improvement to proceed to the emergency room for admission for intravenous steroid therapy.      Continue albuterol p.r.n. and Symbicort 160 mcg 2 puffs twice daily.        Continue CPAP w nasal cushion and oxygen during sleep.  Staff to download report of usage.     Low-dose CT chest surveillance scheduled     Continue Plaquenil.      Conjugated pneumococcal vaccine next visit        Follow up in about 3 months (around 5/21/2023).    This note was prepared using voice recognition system and is likely to have sound alike errors that may have been overlooked even after proof reading.  Please call me with any questions    Discussed diagnosis, its evaluation, treatment and usual course. All questions answered.      Cathryn Schneider MD

## 2023-02-23 ENCOUNTER — PATIENT MESSAGE (OUTPATIENT)
Dept: PULMONOLOGY | Facility: CLINIC | Age: 63
End: 2023-02-23
Payer: MEDICARE

## 2023-02-26 ENCOUNTER — PATIENT MESSAGE (OUTPATIENT)
Dept: PULMONOLOGY | Facility: CLINIC | Age: 63
End: 2023-02-26
Payer: MEDICARE

## 2023-02-26 DIAGNOSIS — G47.33 OSA ON CPAP: Primary | ICD-10-CM

## 2023-02-28 ENCOUNTER — CLINICAL SUPPORT (OUTPATIENT)
Dept: REHABILITATION | Facility: HOSPITAL | Age: 63
End: 2023-02-28
Payer: MEDICARE

## 2023-02-28 DIAGNOSIS — M54.12 RADICULOPATHY, CERVICAL: ICD-10-CM

## 2023-02-28 DIAGNOSIS — M54.16 RADICULOPATHY, LUMBAR REGION: Primary | ICD-10-CM

## 2023-02-28 PROCEDURE — 97110 THERAPEUTIC EXERCISES: CPT

## 2023-02-28 PROCEDURE — 97140 MANUAL THERAPY 1/> REGIONS: CPT

## 2023-02-28 NOTE — PLAN OF CARE
Physical Therapy Treatment Note     Name: Kendra Hodges  Clinic Number: [id number]    Therapy Diagnosis: Low back and neck pain, core weakness, decreased functional ROM  Physician: Order, Paper    Visit Date: 2/28/2023    Physician Orders: Evaluate and treat; Bernice Back Program, Lumbar stabilization core strengthening, cervical stabilization  Medical Diagnosis from Referral: Radiculopathy, lumbar region, Radiculopathy, cervical region  Evaluation Date: 1/5/2023  Authorization Period Expiration: medically necessary  Plan of Care Expiration: 03/30/2023  Visit # / Visits authorized: 11/medically necessary    Time In: 1100  Time Out: 1155  Total Billable Time:  45 minutes    Surgery: none  Orthopedic Precautions:  none  Pertinent History: Lupus    Subjective     Patient reports: Kendra comes back to therapy today after canceled appointments for being sick and doctor's appointment. She is scheduled for steroid injections to the low back on Monday. Neck doing ok today. She agrees to PT treatment session.    Response to previous treatment: good    Pain: 6/10 back, 0/10 neck  Location: bilateral back, neck      Outcome Measure:  CMS Impairment/Limitation/Restriction for FOTO Survey  Therapist reviewed FOTO scores for Kendra Hodges on 01/05/2023.   FOTO documents entered into [employer] - see Media section.     Eval Patient's Physical FS Primary Measure: 42  Eval Risk Adjusted Statistical FOTO: 41  Eval Limitation Score: 58%  Category: Mobility  Eval MDQ: 46.1% disability     2/07/2023: Physical FS Primary Measure: 42  2/07/2023: Limitation Score: 58%  2/07/2023: MDQ: 46.1% disability     Objective     Kendra received the following treatment:     Time Activities   Manual 15 Piriformis stretch, hamstring stretch, LTR manual   TherAct     TherEx 30 Post pelvic tilt, knee to chest, double knee to chest, LTR, bridging, seated trunk rotation, seated trunk flex, hip abd, supine clam    Gait     Neuro Re-ed     Modalities 10 MH to  the neck and low back   E-Stim     Dry Needling     Canalith Repositioning           Home Exercises Provided and Patient Education Provided     Education provided:    -Plan of care, HEP     Written Home Exercises Provided: yes.  Exercises were reviewed and Kendra was able to demonstrate them prior to the end of the session. Kendra demonstrated good understanding of the education provided.     Assessment     Kendra is a 62 y.o. female referred to outpatient Physical Therapy with a medical diagnosis of Radiculopathy, lumbar region, Radiculopathy, cervical region.    Kendra remains with ROM limitations of the low back especially with lower trunk rotation and and to a lesser degree trunk flexion. We continued with targeted exercises and manually stretched as tolerated, it is a bit painful for her. Advised to continue with her exercises and stretching at home. We will continue with the plan care with focus on both lumbar and pelvic stability and flexibility, cervical and upper spine stabilization.    Patient prognosis is Good.      Anticipated barriers to physical therapy: Lupus    Goals: Kendra Is progressing well towards her goals.    Plan     Plan of care Certification: 1/5/2023 to 03/30/2023.     Outpatient Physical Therapy 2-3 times weekly for 12 weeks to include the following interventions: Cervical/Lumbar Traction, Gait Training, Manual Therapy, Moist Heat/ Ice, Neuromuscular Re-ed, Patient Education, Self Care, Therapeutic Activities, and Therapeutic Exercise.     Antoine Medina, PT

## 2023-03-02 ENCOUNTER — TELEPHONE (OUTPATIENT)
Dept: SLEEP MEDICINE | Facility: CLINIC | Age: 63
End: 2023-03-02
Payer: MEDICARE

## 2023-03-02 NOTE — TELEPHONE ENCOUNTER
----- Message from Celine Root sent at 3/2/2023 11:03 AM CST -----  Contact: self [phone]  Patient called in stating she had a CT scan scheduled for March the 7th but I don't see those orders. Please call back [phone]

## 2023-03-02 NOTE — TELEPHONE ENCOUNTER
Good morning,    Pt states that she was suppose to do CT Scan March 7th and stated that she was given this information at last visit with you on 2/21. I did not see a CT order or that a CT was scheduled except the low dose that is schedule in December. Did you want pt do to a CT now?

## 2023-03-03 ENCOUNTER — PATIENT MESSAGE (OUTPATIENT)
Dept: PULMONOLOGY | Facility: CLINIC | Age: 63
End: 2023-03-03
Payer: MEDICARE

## 2023-05-23 ENCOUNTER — OFFICE VISIT (OUTPATIENT)
Dept: PULMONOLOGY | Facility: CLINIC | Age: 63
End: 2023-05-23
Payer: MEDICARE

## 2023-05-23 VITALS
OXYGEN SATURATION: 96 % | HEIGHT: 66 IN | BODY MASS INDEX: 23.66 KG/M2 | RESPIRATION RATE: 16 BRPM | WEIGHT: 147.25 LBS | HEART RATE: 74 BPM

## 2023-05-23 DIAGNOSIS — J44.89 ASTHMA WITH COPD: ICD-10-CM

## 2023-05-23 DIAGNOSIS — M32.9 SYSTEMIC LUPUS ERYTHEMATOSUS, UNSPECIFIED SLE TYPE, UNSPECIFIED ORGAN INVOLVEMENT STATUS: ICD-10-CM

## 2023-05-23 DIAGNOSIS — Z79.899 LONG-TERM USE OF PLAQUENIL: ICD-10-CM

## 2023-05-23 DIAGNOSIS — G47.33 MODERATE OBSTRUCTIVE SLEEP APNEA: Primary | ICD-10-CM

## 2023-05-23 DIAGNOSIS — Z23 NEED FOR VACCINATION FOR PNEUMOCOCCUS: ICD-10-CM

## 2023-05-23 PROCEDURE — 99999 PR PBB SHADOW E&M-EST. PATIENT-LVL III: CPT | Mod: PBBFAC,,, | Performed by: INTERNAL MEDICINE

## 2023-05-23 PROCEDURE — 99214 OFFICE O/P EST MOD 30 MIN: CPT | Mod: S$PBB,,, | Performed by: INTERNAL MEDICINE

## 2023-05-23 PROCEDURE — 99214 PR OFFICE/OUTPT VISIT, EST, LEVL IV, 30-39 MIN: ICD-10-PCS | Mod: S$PBB,,, | Performed by: INTERNAL MEDICINE

## 2023-05-23 PROCEDURE — 99999 PR PBB SHADOW E&M-EST. PATIENT-LVL III: ICD-10-PCS | Mod: PBBFAC,,, | Performed by: INTERNAL MEDICINE

## 2023-05-23 PROCEDURE — 90677 PCV20 VACCINE IM: CPT | Mod: PBBFAC

## 2023-05-23 PROCEDURE — 99213 OFFICE O/P EST LOW 20 MIN: CPT | Mod: PBBFAC,25 | Performed by: INTERNAL MEDICINE

## 2023-05-23 NOTE — PROGRESS NOTES
Pulmonary Outpatient Follow Up Visit     Subjective:       Patient ID: Kendra Hodges is a 63 y.o. female.    Chief Complaint: COPD and Sleep Apnea (/)      HPI        Patient is a 62 y.o. female presents for 3 months follow up.     05/23/2023 overall stable.  Remains on albuterol p.r.n. and Symbicort 160 mcg 2 puffs twice daily.  Smoking few cigarettes a day.      Using her relevant CPAP but thinks the machine is malfunctioning and we were unable to download her usage report.    2/21/2023 AECOPD Jan 2023  Steroids + levaquin BY PMD .  Coughing spells frequent in exam room today.  Congested.  Tested negative back in January for upper respiratory viral infection COVID/flu as per patient.      Low-dose CT scan of the chest 2022 showed nodules less than 4 mm scheduled for 1 year surveillance.    PFT DLCO reduction otherwise unremarkable.  6 minute walking test no need for O2 on exertion.      She initially presented in November 2022  to establish care for asthma with COPD, nocturnal hypoxemia, multiple lung nodules.  Used to follow with pulmonology Dr.Hafiza [facility].        Lupus on plaquenil x 2019     Quit smoking 2020 > 30 pack-year smoking history.    Review of Systems   Constitutional:  Positive for fatigue. Negative for fever and chills.   HENT:  Negative for nosebleeds.    Eyes:  Negative for redness.   Respiratory:  Positive for apnea, snoring, cough, shortness of breath, wheezing, dyspnea on extertion and use of rescue inhaler. Negative for choking.    Cardiovascular:  Negative for leg swelling.   Genitourinary:  Negative for hematuria.   Endocrine:  Negative for cold intolerance.    Musculoskeletal:  Negative for gait problem.        Systemic lupus   Gastrointestinal:  Negative for vomiting.   Neurological:  Negative for syncope.   Hematological:  Negative for adenopathy.   Psychiatric/Behavioral:  Positive for sleep disturbance.  "Negative for confusion.      Outpatient Encounter Medications as of 2023   Medication Sig Dispense Refill    albuterol (PROVENTIL/VENTOLIN HFA) 90 mcg/actuation inhaler Inhale 2 puffs into the lungs every 6 (six) hours as needed for Wheezing or Shortness of Breath. Rescue 18 g 5    atorvastatin (LIPITOR) 10 MG tablet SMARTSI Tablet(s) By Mouth Every Evening      carvediloL (COREG) 12.5 MG tablet Take 12.5 mg by mouth 2 (two) times daily.      cyclobenzaprine (FLEXERIL) 10 MG tablet Take 10 mg by mouth every evening.      hydrOXYchloroQUINE (PLAQUENIL) 200 mg tablet Take 200 mg by mouth 2 (two) times daily.      ibuprofen (ADVIL,MOTRIN) 600 MG tablet       omeprazole (PRILOSEC) 40 MG capsule Take 40 mg by mouth once daily.      SYMBICORT 160-4.5 mcg/actuation HFAA Inhale 2 puffs into the lungs every 12 (twelve) hours. 10.2 g 5    traMADoL (ULTRAM) 50 mg tablet Take 50 mg by mouth as needed.      levalbuterol (XOPENEX) 1.25 mg/3 mL nebulizer solution Take 3 mLs (1.25 mg total) by nebulization every 4 (four) hours as needed for Wheezing or Shortness of Breath. 360 mL 3    [DISCONTINUED] predniSONE (DELTASONE) 10 MG tablet Prednisone 40 mg daily for 3 days then 20 mg daily for 3 days then 10 mg daily for 3 days (Patient not taking: Reported on 2023) 21 tablet 0     No facility-administered encounter medications on file as of 2023.       Objective:     Vital Signs (Most Recent)  Vital Signs  Pulse: 74  Resp: 16  SpO2: 96 %  Height and Weight  Height: 5' 6" (167.6 cm)  Weight: 66.8 kg (147 lb 4.3 oz)  BSA (Calculated - sq m): 1.76 sq meters  BMI (Calculated): 23.8  Weight in (lb) to have BMI = 25: 154.6]  Wt Readings from Last 2 Encounters:   23 66.8 kg (147 lb 4.3 oz)   23 68.9 kg (151 lb 14.4 oz)       Physical Exam   Constitutional: She is oriented to person, place, and time. She appears well-developed and well-nourished. No distress.   HENT:   Head: Normocephalic.   Cardiovascular: Normal " rate and regular rhythm.   Pulmonary/Chest: Normal expansion, hyperinflation and effort normal. No stridor. No respiratory distress. She has decreased breath sounds. She has rhonchi. She exhibits no tenderness.   Abdominal: She exhibits no distension.   Musculoskeletal:         General: No tenderness.      Cervical back: Neck supple.   Lymphadenopathy:     She has no cervical adenopathy.   Neurological: She is alert and oriented to person, place, and time. Gait normal.   Skin: Skin is warm. No cyanosis. Nails show no clubbing.   Psychiatric: She has a normal mood and affect. Her behavior is normal. Judgment and thought content normal.   Nursing note and vitals reviewed.    Laboratory  No results found for: WBC, RBC, HGB, HCT, MCV, MCH, MCHC, RDW, PLT, MPV, GRAN, LYMPH, MONO, EOS, BASO, EOSINOPHIL, BASOPHIL    BMP  No results found for: NA, K, CL, CO2, BUN, CREATININE, CALCIUM, ANIONGAP, ESTGFRAFRICA, EGFRNONAA, AST, ALT, PROT    No results found for: BNP    No results found for: TSH    No results found for: SEDRATE    No results found for: CRP  Lab Results   Component Value Date    IGE <35 02/21/2023        No results found for: ASPERGILLUS  No results found for: AFUMIGATUSCL     No results found for: ACE     Diagnostic Results:  I have personally reviewed today the following studies:        Ldct 12/2022    FINDINGS:  Lungs: There are no abnormal opacities that require further evaluation.  There are small bilateral pleural and parenchymal pulmonary nodules measuring 2-4 mm in size.  No focal consolidation.  No significant changes of emphysema.     Pleura:   No effusion..     Heart and pericardium: Normal size without effusion.     Aorta and vasculature: Atherosclerosis including coronary arteries.     Chest wall and skeletal structures: Unremarkable except age-appropriate degenerative changes.     Upper abdomen: Prominent calcified gallstone measuring 1.7 cm.     Impression:     Lung-RADS Category:  2 - Benign  Appearance or Behavior - continue annual screening with LDCT in 12 months.      Assessment/Plan:   Moderate obstructive sleep apnea  -     CPAP FOR HOME USE    Need for vaccination for pneumococcus  -     (In Office Administered) Pneumococcal Conjugate Vaccine (20 Valent) (IM)    Asthma with COPD    Long-term use of Plaquenil    Systemic lupus erythematosus, unspecified SLE type, unspecified organ involvement status      Continue albuterol p.r.n. and Symbicort 160 mcg 2 puffs twice daily.      Smoking cessation.      Due for yearly low-dose screening CT chest December 2023    Unable to download report from her relevant CPAP.        Continue Plaquenil.      PCV 20.  Immunocompromised on Plaquenil follow-up with rheumatology yearly eye exam        Follow up in about 3 months (around 8/23/2023).    This note was prepared using voice recognition system and is likely to have sound alike errors that may have been overlooked even after proof reading.  Please call me with any questions    Discussed diagnosis, its evaluation, treatment and usual course. All questions answered.      Cathryn Schneider MD

## 2023-05-24 ENCOUNTER — PATIENT MESSAGE (OUTPATIENT)
Dept: PULMONOLOGY | Facility: CLINIC | Age: 63
End: 2023-05-24
Payer: MEDICARE

## 2023-06-15 ENCOUNTER — PATIENT MESSAGE (OUTPATIENT)
Dept: PULMONOLOGY | Facility: CLINIC | Age: 63
End: 2023-06-15
Payer: MEDICARE

## 2023-07-07 ENCOUNTER — PATIENT MESSAGE (OUTPATIENT)
Dept: INFECTIOUS DISEASES | Facility: CLINIC | Age: 63
End: 2023-07-07
Payer: MEDICARE

## 2023-07-11 ENCOUNTER — HOSPITAL ENCOUNTER (OUTPATIENT)
Dept: RADIOLOGY | Facility: HOSPITAL | Age: 63
Discharge: HOME | End: 2023-07-11
Attending: SPECIALIST
Payer: MEDICARE

## 2023-07-11 DIAGNOSIS — I70.208 BRACHIAL ARTERY OCCLUSION, LEFT: Primary | ICD-10-CM

## 2023-07-11 DIAGNOSIS — Z01.811 PRE-OP CHEST EXAM: ICD-10-CM

## 2023-07-11 PROCEDURE — 71046 X-RAY EXAM CHEST 2 VIEWS: CPT | Mod: TC

## 2023-07-25 ENCOUNTER — PATIENT MESSAGE (OUTPATIENT)
Dept: PULMONOLOGY | Facility: CLINIC | Age: 63
End: 2023-07-25
Payer: MEDICARE

## 2023-08-02 ENCOUNTER — LAB VISIT (OUTPATIENT)
Dept: LAB | Facility: HOSPITAL | Age: 63
End: 2023-08-02
Attending: OTOLARYNGOLOGY
Payer: MEDICARE

## 2023-08-02 DIAGNOSIS — E04.1 NONTOXIC UNINODULAR GOITER: Primary | ICD-10-CM

## 2023-08-02 DIAGNOSIS — E07.0 HYPERSECRETION OF CALCITONIN: ICD-10-CM

## 2023-08-02 DIAGNOSIS — E03.9 MYXEDEMA HEART DISEASE: ICD-10-CM

## 2023-08-02 DIAGNOSIS — I51.9 MYXEDEMA HEART DISEASE: ICD-10-CM

## 2023-08-02 DIAGNOSIS — Z12.89 ENCOUNTER FOR SCREENING FOR MALIGNANT NEOPLASM OF OTHER SITES: ICD-10-CM

## 2023-08-02 DIAGNOSIS — R97.0 ELEVATED CARCINOEMBRYONIC ANTIGEN (CEA): ICD-10-CM

## 2023-08-02 LAB
CEA SERPL-MCNC: 2.74 NG/ML (ref 0–3)
T3FREE SERPL-MCNC: 2.67 PG/ML (ref 1.58–3.91)
T4 FREE SERPL-MCNC: 0.95 NG/DL (ref 0.7–1.48)
TSH SERPL-ACNC: 0.81 UIU/ML (ref 0.35–4.94)

## 2023-08-02 PROCEDURE — 84439 ASSAY OF FREE THYROXINE: CPT

## 2023-08-02 PROCEDURE — 82308 ASSAY OF CALCITONIN: CPT

## 2023-08-02 PROCEDURE — 36415 COLL VENOUS BLD VENIPUNCTURE: CPT

## 2023-08-02 PROCEDURE — 84481 FREE ASSAY (FT-3): CPT

## 2023-08-02 PROCEDURE — 82378 CARCINOEMBRYONIC ANTIGEN: CPT

## 2023-08-02 PROCEDURE — 84443 ASSAY THYROID STIM HORMONE: CPT

## 2023-08-03 LAB — MAYO GENERIC ORDERABLE RESULT: NORMAL

## 2023-08-04 DIAGNOSIS — J44.89 ASTHMA WITH COPD: ICD-10-CM

## 2023-08-04 RX ORDER — ALBUTEROL SULFATE 90 UG/1
2 AEROSOL, METERED RESPIRATORY (INHALATION) EVERY 6 HOURS PRN
Qty: 18 G | Refills: 5 | Status: SHIPPED | OUTPATIENT
Start: 2023-08-04

## 2023-08-04 RX ORDER — BUDESONIDE AND FORMOTEROL FUMARATE DIHYDRATE 160; 4.5 UG/1; UG/1
2 AEROSOL RESPIRATORY (INHALATION) EVERY 12 HOURS
Qty: 10.2 G | Refills: 5 | Status: SHIPPED | OUTPATIENT
Start: 2023-08-04

## 2023-08-04 RX ORDER — LEVALBUTEROL INHALATION SOLUTION 1.25 MG/3ML
1 SOLUTION RESPIRATORY (INHALATION) EVERY 4 HOURS PRN
Qty: 360 ML | Refills: 3 | Status: SHIPPED | OUTPATIENT
Start: 2023-08-04 | End: 2023-11-02

## 2023-09-01 ENCOUNTER — OFFICE VISIT (OUTPATIENT)
Dept: PULMONOLOGY | Facility: CLINIC | Age: 63
End: 2023-09-01
Payer: MEDICARE

## 2023-09-01 VITALS
RESPIRATION RATE: 16 BRPM | BODY MASS INDEX: 24.13 KG/M2 | HEIGHT: 66 IN | OXYGEN SATURATION: 97 % | WEIGHT: 150.13 LBS | HEART RATE: 60 BPM | SYSTOLIC BLOOD PRESSURE: 120 MMHG | DIASTOLIC BLOOD PRESSURE: 80 MMHG

## 2023-09-01 DIAGNOSIS — G47.33 MODERATE OBSTRUCTIVE SLEEP APNEA: Primary | ICD-10-CM

## 2023-09-01 DIAGNOSIS — Z87.891 STOPPED SMOKING WITH GREATER THAN 30 PACK YEAR HISTORY: ICD-10-CM

## 2023-09-01 DIAGNOSIS — J44.89 ASTHMA WITH COPD: ICD-10-CM

## 2023-09-01 DIAGNOSIS — G47.33 OSA ON CPAP: ICD-10-CM

## 2023-09-01 PROCEDURE — 99999 PR PBB SHADOW E&M-EST. PATIENT-LVL IV: CPT | Mod: PBBFAC,,, | Performed by: NURSE PRACTITIONER

## 2023-09-01 PROCEDURE — 99214 PR OFFICE/OUTPT VISIT, EST, LEVL IV, 30-39 MIN: ICD-10-PCS | Mod: S$PBB,,, | Performed by: NURSE PRACTITIONER

## 2023-09-01 PROCEDURE — 99999 PR PBB SHADOW E&M-EST. PATIENT-LVL IV: ICD-10-PCS | Mod: PBBFAC,,, | Performed by: NURSE PRACTITIONER

## 2023-09-01 PROCEDURE — 99214 OFFICE O/P EST MOD 30 MIN: CPT | Mod: PBBFAC | Performed by: NURSE PRACTITIONER

## 2023-09-01 PROCEDURE — 99214 OFFICE O/P EST MOD 30 MIN: CPT | Mod: S$PBB,,, | Performed by: NURSE PRACTITIONER

## 2023-09-01 RX ORDER — EZETIMIBE 10 MG/1
10 TABLET ORAL
COMMUNITY
Start: 2023-08-07

## 2023-09-01 RX ORDER — PANTOPRAZOLE SODIUM 40 MG/1
40 TABLET, DELAYED RELEASE ORAL EVERY MORNING
COMMUNITY
Start: 2023-08-21

## 2023-09-01 RX ORDER — ASPIRIN 81 MG/1
1 TABLET ORAL EVERY MORNING
COMMUNITY
Start: 2023-07-19 | End: 2024-07-18

## 2023-09-01 NOTE — PROGRESS NOTES
"Subjective:      Patient ID: Kendra Hodges is a 63 y.o. female.    Chief Complaint: Sleep Apnea    HPI  Paitent with COPD and EDGAR.  Patient states improved symptoms with use of AutoPAP. Sleeping more soundly. Waking up feeling more refreshed. Improved daytime sleepiness. Patient states she is benefiting from use of the AutoPAP. Symbicort 2x day. She typically takes neb tx in AM due to chronic congestion.  Quit smoking 2020.     Patient Active Problem List   Diagnosis    Moderate obstructive sleep apnea    Asthma with COPD    Stopped smoking with greater than 30 pack year history     /80   Pulse 60   Resp 16   Ht 5' 6" (1.676 m)   Wt 68.1 kg (150 lb 2.1 oz)   SpO2 97%   BMI 24.23 kg/m²   Body mass index is 24.23 kg/m².    Review of Systems   Respiratory:  Positive for cough.    All other systems reviewed and are negative.    Objective:      Physical Exam  Constitutional:       Appearance: She is well-developed. She is obese.   HENT:      Head: Normocephalic and atraumatic.      Nose: Nose normal.   Cardiovascular:      Rate and Rhythm: Normal rate and regular rhythm.      Heart sounds: No murmur heard.     No gallop.   Pulmonary:      Effort: Pulmonary effort is normal.      Breath sounds: Normal breath sounds.   Abdominal:      Palpations: Abdomen is soft.      Tenderness: There is no abdominal tenderness.   Musculoskeletal:         General: Normal range of motion.      Cervical back: Normal range of motion and neck supple.   Skin:     General: Skin is warm and dry.   Neurological:      Mental Status: She is alert and oriented to person, place, and time.   Psychiatric:         Mood and Affect: Mood normal.         Behavior: Behavior normal.       Personal Diagnostic Review    Results for orders placed during the hospital encounter of 07/11/23    X-Ray Chest PA And Lateral    Narrative  EXAMINATION:  XR CHEST PA AND LATERAL    CLINICAL HISTORY:  Encounter for preprocedural respiratory " examination    TECHNIQUE:  PA and lateral views of the chest were performed.    COMPARISON:  2023    FINDINGS:  No acute infiltrates are seen.  Heart size is within normal limits.  The costophrenic angles are clear.  There is vascular calcification noted.    Impression  No acute disease is noted      Electronically signed by: All Paredes MD  Date:    2023  Time:    12:33        Assessment:       1. Moderate obstructive sleep apnea    2. Asthma with COPD    3. EDGAR on CPAP    4. Stopped smoking with greater than 30 pack year history        Outpatient Encounter Medications as of 2023   Medication Sig Dispense Refill    albuterol (PROVENTIL/VENTOLIN HFA) 90 mcg/actuation inhaler Inhale 2 puffs into the lungs every 6 (six) hours as needed for Wheezing or Shortness of Breath. Rescue 18 g 5    aspirin (ECOTRIN) 81 MG EC tablet Take 1 tablet by mouth every morning.      atorvastatin (LIPITOR) 10 MG tablet SMARTSI Tablet(s) By Mouth Every Evening      carvediloL (COREG) 12.5 MG tablet Take 12.5 mg by mouth 2 (two) times daily.      cyclobenzaprine (FLEXERIL) 10 MG tablet Take 10 mg by mouth every evening.      ezetimibe (ZETIA) 10 mg tablet Take 10 mg by mouth.      hydrOXYchloroQUINE (PLAQUENIL) 200 mg tablet Take 200 mg by mouth 2 (two) times daily.      ibuprofen (ADVIL,MOTRIN) 600 MG tablet       levalbuterol (XOPENEX) 1.25 mg/3 mL nebulizer solution Take 3 mLs (1.25 mg total) by nebulization every 4 (four) hours as needed for Wheezing or Shortness of Breath. 360 mL 3    omeprazole (PRILOSEC) 40 MG capsule Take 40 mg by mouth once daily.      pantoprazole (PROTONIX) 40 MG tablet Take 40 mg by mouth every morning.      SYMBICORT 160-4.5 mcg/actuation HFAA Inhale 2 puffs into the lungs every 12 (twelve) hours. 10.2 g 5    traMADoL (ULTRAM) 50 mg tablet Take 50 mg by mouth as needed.      [DISCONTINUED] albuterol (PROVENTIL/VENTOLIN HFA) 90 mcg/actuation inhaler Inhale 2 puffs into the lungs every 6  (six) hours as needed for Wheezing or Shortness of Breath. Rescue 18 g 5    [DISCONTINUED] levalbuterol (XOPENEX) 1.25 mg/3 mL nebulizer solution Take 3 mLs (1.25 mg total) by nebulization every 4 (four) hours as needed for Wheezing or Shortness of Breath. 360 mL 3    [DISCONTINUED] SYMBICORT 160-4.5 mcg/actuation HFAA Inhale 2 puffs into the lungs every 12 (twelve) hours. 10.2 g 5     No facility-administered encounter medications on file as of 9/1/2023.     Orders Placed This Encounter   Procedures    Spirometry without Bronchodilator     Standing Status:   Future     Standing Expiration Date:   9/1/2024     Order Specific Question:   Release to patient     Answer:   Immediate     Plan:   Compliant with PAP and benefits from use.   CT scheduled for December  Continue current pulmonary drug regimen.  Follow up 6 months  Problem List Items Addressed This Visit          Pulmonary    Asthma with COPD    Relevant Orders    Spirometry without Bronchodilator       Other    Moderate obstructive sleep apnea - Primary    Stopped smoking with greater than 30 pack year history     Other Visit Diagnoses       EDGAR on CPAP                             Elizabeth LeJeune, ACNP, ANP

## 2023-09-18 ENCOUNTER — PATIENT MESSAGE (OUTPATIENT)
Dept: PULMONOLOGY | Facility: CLINIC | Age: 63
End: 2023-09-18
Payer: MEDICARE

## 2023-09-19 DIAGNOSIS — G47.33 OSA (OBSTRUCTIVE SLEEP APNEA): Primary | ICD-10-CM

## 2023-11-03 ENCOUNTER — LAB VISIT (OUTPATIENT)
Dept: LAB | Facility: HOSPITAL | Age: 63
End: 2023-11-03
Attending: INTERNAL MEDICINE
Payer: MEDICARE

## 2023-11-03 DIAGNOSIS — E78.5 HYPERLIPIDEMIA, UNSPECIFIED HYPERLIPIDEMIA TYPE: ICD-10-CM

## 2023-11-03 DIAGNOSIS — I25.10 CORONARY ATHEROSCLEROSIS OF NATIVE CORONARY ARTERY: Primary | ICD-10-CM

## 2023-11-03 LAB
ALBUMIN SERPL-MCNC: 4.3 G/DL (ref 3.4–4.8)
ALP SERPL-CCNC: 82 UNIT/L (ref 40–150)
ALT SERPL-CCNC: 20 UNIT/L (ref 0–55)
AST SERPL-CCNC: 20 UNIT/L (ref 5–34)
BILIRUB SERPL-MCNC: 0.7 MG/DL
BILIRUBIN DIRECT+TOT PNL SERPL-MCNC: 0.3 MG/DL (ref 0–?)
BILIRUBIN DIRECT+TOT PNL SERPL-MCNC: 0.4 MG/DL (ref 0–0.8)
CHOLEST SERPL-MCNC: 131 MG/DL
CHOLEST/HDLC SERPL: 3 {RATIO} (ref 0–5)
HDLC SERPL-MCNC: 40 MG/DL (ref 35–60)
LDLC SERPL CALC-MCNC: 78 MG/DL (ref 50–140)
PROT SERPL-MCNC: 7 GM/DL (ref 5.8–7.6)
TRIGL SERPL-MCNC: 66 MG/DL (ref 37–140)
VLDLC SERPL CALC-MCNC: 13 MG/DL

## 2023-11-03 PROCEDURE — 80061 LIPID PANEL: CPT

## 2023-11-03 PROCEDURE — 80076 HEPATIC FUNCTION PANEL: CPT

## 2023-11-03 PROCEDURE — 36415 COLL VENOUS BLD VENIPUNCTURE: CPT

## 2023-11-13 ENCOUNTER — LAB VISIT (OUTPATIENT)
Dept: LAB | Facility: HOSPITAL | Age: 63
End: 2023-11-13
Attending: NURSE PRACTITIONER
Payer: MEDICARE

## 2023-11-13 DIAGNOSIS — I10 ESSENTIAL HYPERTENSION, MALIGNANT: Primary | ICD-10-CM

## 2023-11-13 DIAGNOSIS — I10 ESSENTIAL HYPERTENSION, MALIGNANT: ICD-10-CM

## 2023-11-13 LAB
ANION GAP SERPL CALC-SCNC: 9 MEQ/L
BUN SERPL-MCNC: 12.3 MG/DL (ref 9.8–20.1)
CALCIUM SERPL-MCNC: 10.2 MG/DL (ref 8.4–10.2)
CHLORIDE SERPL-SCNC: 108 MMOL/L (ref 98–107)
CO2 SERPL-SCNC: 27 MMOL/L (ref 23–31)
CREAT SERPL-MCNC: 0.68 MG/DL (ref 0.55–1.02)
CREAT/UREA NIT SERPL: 18
GFR SERPLBLD CREATININE-BSD FMLA CKD-EPI: >60 MLS/MIN/1.73/M2
GLUCOSE SERPL-MCNC: 79 MG/DL (ref 82–115)
POTASSIUM SERPL-SCNC: 3.8 MMOL/L (ref 3.5–5.1)
SODIUM SERPL-SCNC: 144 MMOL/L (ref 136–145)

## 2023-11-13 PROCEDURE — 36415 COLL VENOUS BLD VENIPUNCTURE: CPT

## 2023-12-06 ENCOUNTER — HOSPITAL ENCOUNTER (OUTPATIENT)
Dept: RADIOLOGY | Facility: HOSPITAL | Age: 63
Discharge: HOME | End: 2023-12-06
Attending: INTERNAL MEDICINE
Payer: MEDICARE

## 2023-12-06 DIAGNOSIS — Z87.891 STOPPED SMOKING WITH GREATER THAN 30 PACK YEAR HISTORY: ICD-10-CM

## 2023-12-06 PROCEDURE — 71271 CT THORAX LUNG CANCER SCR C-: CPT | Mod: TC

## 2023-12-06 PROCEDURE — 71271 CT THORAX LUNG CANCER SCR C-: CPT | Mod: 26,,, | Performed by: RADIOLOGY

## 2023-12-06 PROCEDURE — 71271 CT CHEST LUNG SCREENING LOW DOSE: ICD-10-PCS | Mod: 26,,, | Performed by: RADIOLOGY

## 2023-12-07 DIAGNOSIS — Z87.891 STOPPED SMOKING WITH GREATER THAN 30 PACK YEAR HISTORY: Primary | ICD-10-CM

## 2024-01-18 DIAGNOSIS — J44.9 CHRONIC OBSTRUCTIVE PULMONARY DISEASE, UNSPECIFIED COPD TYPE: Primary | ICD-10-CM

## 2024-01-18 RX ORDER — FLUTICASONE PROPIONATE AND SALMETEROL 250; 50 UG/1; UG/1
1 POWDER RESPIRATORY (INHALATION) 2 TIMES DAILY
Qty: 60 EACH | Refills: 11 | Status: SHIPPED | OUTPATIENT
Start: 2024-01-18 | End: 2024-06-05

## 2024-02-06 ENCOUNTER — LAB VISIT (OUTPATIENT)
Dept: LAB | Facility: HOSPITAL | Age: 64
End: 2024-02-06
Attending: INTERNAL MEDICINE
Payer: MEDICARE

## 2024-02-06 DIAGNOSIS — Z87.891 PERSONAL HISTORY OF TOBACCO USE, PRESENTING HAZARDS TO HEALTH: ICD-10-CM

## 2024-02-06 DIAGNOSIS — I25.10 CORONARY ATHEROSCLEROSIS OF NATIVE CORONARY ARTERY: Primary | ICD-10-CM

## 2024-02-06 DIAGNOSIS — I25.10 CORONARY ATHEROSCLEROSIS OF NATIVE CORONARY ARTERY: ICD-10-CM

## 2024-02-06 DIAGNOSIS — E78.5 HYPERLIPEMIA: ICD-10-CM

## 2024-02-06 LAB
ALBUMIN SERPL-MCNC: 4.2 G/DL (ref 3.4–4.8)
ALP SERPL-CCNC: 81 UNIT/L (ref 40–150)
ALT SERPL-CCNC: 24 UNIT/L (ref 0–55)
AST SERPL-CCNC: 22 UNIT/L (ref 5–34)
BILIRUB SERPL-MCNC: 0.7 MG/DL
BILIRUBIN DIRECT+TOT PNL SERPL-MCNC: 0.2 MG/DL (ref 0–?)
BILIRUBIN DIRECT+TOT PNL SERPL-MCNC: 0.5 MG/DL (ref 0–0.8)
CHOLEST SERPL-MCNC: 136 MG/DL
CHOLEST/HDLC SERPL: 3 {RATIO} (ref 0–5)
HDLC SERPL-MCNC: 47 MG/DL (ref 35–60)
LDLC SERPL CALC-MCNC: 75 MG/DL (ref 50–140)
PATH REV: NORMAL
PROT SERPL-MCNC: 6.6 GM/DL (ref 5.8–7.6)
TRIGL SERPL-MCNC: 69 MG/DL (ref 37–140)
VLDLC SERPL CALC-MCNC: 14 MG/DL

## 2024-02-06 PROCEDURE — 36415 COLL VENOUS BLD VENIPUNCTURE: CPT

## 2024-03-19 DIAGNOSIS — J44.89 ASTHMA WITH COPD: Primary | ICD-10-CM

## 2024-03-19 RX ORDER — BUDESONIDE, GLYCOPYRROLATE, AND FORMOTEROL FUMARATE 160; 9; 4.8 UG/1; UG/1; UG/1
2 AEROSOL, METERED RESPIRATORY (INHALATION) 2 TIMES DAILY
Qty: 10.7 G | Refills: 6 | Status: SHIPPED | OUTPATIENT
Start: 2024-03-19

## 2024-05-13 ENCOUNTER — LAB VISIT (OUTPATIENT)
Dept: LAB | Facility: HOSPITAL | Age: 64
End: 2024-05-13
Attending: INTERNAL MEDICINE
Payer: MEDICARE

## 2024-05-13 DIAGNOSIS — E78.5 HYPERLIPIDEMIA, UNSPECIFIED HYPERLIPIDEMIA TYPE: ICD-10-CM

## 2024-05-13 DIAGNOSIS — I73.9 PERIPHERAL VASCULAR DISEASE, UNSPECIFIED: Primary | ICD-10-CM

## 2024-05-13 DIAGNOSIS — I25.10 CORONARY ATHEROSCLEROSIS OF NATIVE CORONARY ARTERY: ICD-10-CM

## 2024-05-13 LAB
ALBUMIN SERPL-MCNC: 3.9 G/DL (ref 3.4–4.8)
ALP SERPL-CCNC: 69 UNIT/L (ref 40–150)
ALT SERPL-CCNC: 18 UNIT/L (ref 0–55)
AST SERPL-CCNC: 18 UNIT/L (ref 5–34)
BASOPHILS # BLD AUTO: 0.09 X10(3)/MCL
BASOPHILS NFR BLD AUTO: 1.1 %
BILIRUB DIRECT SERPL-MCNC: 0.2 MG/DL (ref 0–?)
BILIRUB SERPL-MCNC: 0.6 MG/DL
BILIRUBIN DIRECT+TOT PNL SERPL-MCNC: 0.4 MG/DL (ref 0–0.8)
CHOLEST SERPL-MCNC: 127 MG/DL
CHOLEST/HDLC SERPL: 3 {RATIO} (ref 0–5)
EOSINOPHIL # BLD AUTO: 0.26 X10(3)/MCL (ref 0–0.9)
EOSINOPHIL NFR BLD AUTO: 3.3 %
ERYTHROCYTE [DISTWIDTH] IN BLOOD BY AUTOMATED COUNT: 12.3 % (ref 11.5–17)
HCT VFR BLD AUTO: 42.9 % (ref 37–47)
HDLC SERPL-MCNC: 41 MG/DL (ref 35–60)
HGB BLD-MCNC: 14.6 G/DL (ref 12–16)
IMM GRANULOCYTES # BLD AUTO: 0.02 X10(3)/MCL (ref 0–0.04)
IMM GRANULOCYTES NFR BLD AUTO: 0.3 %
LDLC SERPL CALC-MCNC: 66 MG/DL (ref 50–140)
LYMPHOCYTES # BLD AUTO: 1.29 X10(3)/MCL (ref 0.6–4.6)
LYMPHOCYTES NFR BLD AUTO: 16.3 %
MCH RBC QN AUTO: 32.1 PG (ref 27–31)
MCHC RBC AUTO-ENTMCNC: 34 G/DL (ref 33–36)
MCV RBC AUTO: 94.3 FL (ref 80–94)
MONOCYTES # BLD AUTO: 0.6 X10(3)/MCL (ref 0.1–1.3)
MONOCYTES NFR BLD AUTO: 7.6 %
NEUTROPHILS # BLD AUTO: 5.65 X10(3)/MCL (ref 2.1–9.2)
NEUTROPHILS NFR BLD AUTO: 71.4 %
NRBC BLD AUTO-RTO: 0 %
PLATELET # BLD AUTO: 188 X10(3)/MCL (ref 130–400)
PMV BLD AUTO: 10.5 FL (ref 7.4–10.4)
PROT SERPL-MCNC: 6.4 GM/DL (ref 5.8–7.6)
RBC # BLD AUTO: 4.55 X10(6)/MCL (ref 4.2–5.4)
TRIGL SERPL-MCNC: 101 MG/DL (ref 37–140)
VLDLC SERPL CALC-MCNC: 20 MG/DL
WBC # SPEC AUTO: 7.91 X10(3)/MCL (ref 4.5–11.5)

## 2024-05-13 PROCEDURE — 85025 COMPLETE CBC W/AUTO DIFF WBC: CPT

## 2024-05-13 PROCEDURE — 36415 COLL VENOUS BLD VENIPUNCTURE: CPT

## 2024-05-13 PROCEDURE — 80061 LIPID PANEL: CPT

## 2024-05-13 PROCEDURE — 80076 HEPATIC FUNCTION PANEL: CPT

## 2024-05-29 DIAGNOSIS — D43.4 NEOPLASM OF UNCERTAIN BEHAVIOR OF SPINAL CORD: Primary | ICD-10-CM

## 2024-05-31 ENCOUNTER — TELEPHONE (OUTPATIENT)
Dept: NEUROSURGERY | Facility: CLINIC | Age: 64
End: 2024-05-31
Payer: MEDICARE

## 2024-05-31 DIAGNOSIS — M47.22 CERVICAL SPONDYLOSIS WITH RADICULOPATHY: Primary | ICD-10-CM

## 2024-05-31 NOTE — TELEPHONE ENCOUNTER
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     MRI Cervical 5/16/24 (images/report in chart) findings state:    Compared to recent un-enhanced exam of 5/8/2024 (images/report in chart). Corresponding to previously described intradural lesion at the level of the superior aspect of C4 toward the right, there is homogeneously enhancing solid lesion measuring about 6 x 4 mm axially by 6 mm craniocaudal. The lesion appears to demonstrate abroad-based dural attachment, not appearing to cause significant mass effect on the cervical cord.No other enhancing lesion is seen within the spinal canal. No abnormal enhancement is seenwithin the visualized portion of the posterior fossa. There is no suspicious marrow enhancement.     MRI Cervical 5/8/24 (images/report in chart) impression states:   Straightening of the cervical spine which can be seen with muscle spasm.     Modic changes at C6-C7.    Suspected intradural mass at the C3-C4 level, within the right subarticular region. This appears unchanged compared to the prior exam. Follow-up MRI of the cervical spine with IV contrast is requested.    Posterior disc bulge at C4-C5 without central canal stenosis. Mild to moderate right foraminalstenosis.    Mild central canal stenosis at C5-C6 with severe bilateral foraminal stenosis.    Mild central canal stenosis at C6-C7 with severe left foraminal stenosis.    The above findings are unchanged in the interval.      Please review and advise re scheduling. Thank you

## 2024-05-31 NOTE — TELEPHONE ENCOUNTER
She should see KELSY with cervical x-rays with flex/ext views.  Looks like a meningioma that seems to be incidental finding.  She also has foraminal stenosis lower.  Sooner rather than later.

## 2024-06-05 ENCOUNTER — OFFICE VISIT (OUTPATIENT)
Dept: NEUROSURGERY | Facility: CLINIC | Age: 64
End: 2024-06-05
Payer: MEDICARE

## 2024-06-05 ENCOUNTER — HOSPITAL ENCOUNTER (OUTPATIENT)
Dept: RADIOLOGY | Facility: HOSPITAL | Age: 64
Discharge: HOME | End: 2024-06-05
Attending: PHYSICIAN ASSISTANT
Payer: MEDICARE

## 2024-06-05 VITALS
BODY MASS INDEX: 24.27 KG/M2 | RESPIRATION RATE: 16 BRPM | WEIGHT: 151 LBS | SYSTOLIC BLOOD PRESSURE: 139 MMHG | DIASTOLIC BLOOD PRESSURE: 73 MMHG | HEART RATE: 60 BPM | HEIGHT: 66 IN

## 2024-06-05 DIAGNOSIS — D32.1 MENINGIOMA, SPINAL: Primary | ICD-10-CM

## 2024-06-05 DIAGNOSIS — D43.4 NEOPLASM OF UNCERTAIN BEHAVIOR OF SPINAL CORD: ICD-10-CM

## 2024-06-05 DIAGNOSIS — M47.22 CERVICAL SPONDYLOSIS WITH RADICULOPATHY: ICD-10-CM

## 2024-06-05 PROCEDURE — 99204 OFFICE O/P NEW MOD 45 MIN: CPT | Mod: ,,, | Performed by: PHYSICIAN ASSISTANT

## 2024-06-05 PROCEDURE — 99406 BEHAV CHNG SMOKING 3-10 MIN: CPT | Mod: ,,, | Performed by: PHYSICIAN ASSISTANT

## 2024-06-05 PROCEDURE — 72052 X-RAY EXAM NECK SPINE 6/>VWS: CPT | Mod: TC

## 2024-06-05 RX ORDER — MELOXICAM 7.5 MG/1
1 TABLET ORAL EVERY MORNING
COMMUNITY
Start: 2024-02-15 | End: 2024-06-05

## 2024-06-05 RX ORDER — TIZANIDINE 4 MG/1
4 TABLET ORAL EVERY 8 HOURS PRN
COMMUNITY
Start: 2024-05-06

## 2024-06-05 RX ORDER — LOSARTAN POTASSIUM 50 MG/1
50 TABLET ORAL DAILY
COMMUNITY
Start: 2024-05-14

## 2024-06-05 RX ORDER — MYCOPHENOLATE MOFETIL 500 MG/1
1000 TABLET ORAL 2 TIMES DAILY
COMMUNITY
Start: 2024-06-04

## 2024-06-05 RX ORDER — FLUTICASONE FUROATE AND VILANTEROL 100; 25 UG/1; UG/1
1 POWDER RESPIRATORY (INHALATION)
COMMUNITY
Start: 2024-03-20 | End: 2024-06-05

## 2024-06-05 RX ORDER — ATORVASTATIN CALCIUM 20 MG/1
20 TABLET, FILM COATED ORAL DAILY
COMMUNITY
Start: 2024-05-12

## 2024-06-05 RX ORDER — METHOCARBAMOL 750 MG/1
750 TABLET, FILM COATED ORAL
COMMUNITY
Start: 2024-01-31 | End: 2024-06-05

## 2024-06-05 NOTE — PROGRESS NOTES
[facility]  History & Physical  Neurosurgery      Kendra Hodges   [id number]   1960       SUBJECTIVE:     CHIEF COMPLAINT:  No chief complaint on file.      HPI:  Kendra Hodges is a 64 y.o. female who presents for neurosurgical evaluation.  She has been referred to Dr. Chaves by Dr. Morgan.      Neck pain with pain radiating into the right trap and shoulder.  2019 dx with cervical DDD OA in the neck.  In [place] at the time.  Onset 2022 insidious onset of neck pain.    Pain is intermittent.  + tingling and numbness tip of left 1, 2 fingers.  No weak.  Pain worse with running jumping, jogging.  Worsens neck and LBP.    LBP with pain along the posterior thighs.  RFA with Dr. Mogran, help yet continues with burning pain.    Underwent LESI with Dr. Loc Hatfield which did not help.  He then referred to Dr. Morgan.  She has seen him about 9 months.      + int dizzy spells.  The patient denies disturbances in bowel or bladder function.  There is no difficulty with balance.       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     EDGAR (obstructive sleep apnea)     Osteoarthritis     RA (rheumatoid arthritis)     Systemic lupus erythematosus        Past Surgical History:   Procedure Laterality Date    CARPAL TUNNEL RELEASE Right     KNEE SURGERY Left     LEFT AXILLARY TO BRACHIAL BYPASS WITH LEFT LEG VEIN HARVEST Left 07/18/2023    OVARIAN CYST REMOVAL      SHOULDER SURGERY Right        Family History   Problem Relation Name Age of Onset    Hypertension Mother      Heart failure Mother      Hypertension Father      Diabetes Father      Parkinsonism Father         Social History     Socioeconomic History    Marital status:    Tobacco  Use    Smoking status: Former     Types: Cigarettes    Smokeless tobacco: Never   Substance and Sexual Activity    Alcohol use: Not Currently    Drug use: Never     Social Determinants of Health     Financial Resource Strain: Low Risk  (3/1/2023)    [facility]    Overall Financial Resource Strain (CARDIA)     Difficulty of Paying Living Expenses: Not hard at all   Food Insecurity: No Food Insecurity (7/18/2023)    [facility]    Hunger Vital Sign     Worried About Running Out of Food in the Last Year: Never true     Ran Out of Food in the Last Year: Never true   Transportation Needs: No Transportation Needs (3/1/2023)    [facility], [facility]    PRAPARE - Transportation     Lack of Transportation (Medical): No     Lack of Transportation (Non-Medical): No   Physical Activity: Insufficiently Active (3/1/2023)    [facility], [facility]    Exercise Vital Sign     Days of Exercise per Week: 7 days     Minutes of Exercise per Session: 20 min   Stress: No Stress Concern Present (3/1/2023)    [facility]    [employer] [place] [employer] - Occupational Stress Questionnaire      Feeling of Stress : Not at all   Housing Stability: Low Risk  (3/1/2023)    [facility], [facility]    Housing Stability Vital Sign     Unable to Pay for Housing in the Last Year: No     Number of Places Lived in the Last Year: 2     In the last 12 months, was there a time when you did not have a steady place to sleep or slept in a shelter (including now)?: No        Review of patient's allergies indicates:   Allergen Reactions    Hydrochlorothiazide Hives and Shortness Of Breath    Pilocarpine Hives, Itching, Rash and Swelling    Shellfish containing products Dermatitis, Hives, Itching, Rash and Swelling    Sulfa (sulfonamide antibiotics) Hives, Itching and Rash        Current Outpatient Medications   Medication Instructions    albuterol (PROVENTIL/VENTOLIN HFA) 90 mcg/actuation inhaler 2 puffs, Inhalation, Every 6 hours PRN, Rescue    aspirin (ECOTRIN) 81 MG EC tablet 1 tablet, Oral, Every morning    atorvastatin (LIPITOR) 20 mg, Oral, Daily    budesonide-glycopyr-formoterol (BREZTRI AEROSPHERE) 160-9-4.8 mcg/actuation HFAA 2 puffs, Inhalation, 2 times daily    carvediloL (COREG) 12.5 mg, Oral, 2 times daily    ezetimibe (ZETIA) 10 mg, Oral    hydroxychloroquine (PLAQUENIL) 200 mg, Oral, 2 times daily    ibuprofen (ADVIL,MOTRIN) 600 MG tablet No dose, route, or frequency recorded.    levalbuterol (XOPENEX) 1.25 mg, Nebulization, Every 4 hours PRN    losartan (COZAAR) 50 mg, Oral, Daily    mycophenolate (CELLCEPT) 1,000 mg, Oral, 2 times daily    pantoprazole (PROTONIX) 40 mg, Oral, Every morning    tiZANidine (ZANAFLEX) 4 mg, Oral, Every 8 hours PRN, FOR MUSCLE SPASM    traMADoL (ULTRAM) 50 mg, Oral, As needed (PRN)        Review of Systems   Constitutional:  Negative for chills and fever.   HENT:  Negative for nosebleeds and sore throat.    Eyes:   "Negative for pain and visual disturbance.   Respiratory:  Negative for cough, chest tightness and shortness of breath.    Cardiovascular:  Negative for chest pain.   Gastrointestinal:  Negative for diarrhea, nausea and vomiting.   Genitourinary:  Negative for difficulty urinating, dysuria and hematuria.   Musculoskeletal:  Positive for back pain and neck pain. Negative for gait problem and myalgias.   Skin:  Negative for rash.   Neurological:  Positive for dizziness, numbness and headaches. Negative for facial asymmetry and weakness.   Psychiatric/Behavioral:  Negative for confusion and sleep disturbance. The patient is not nervous/anxious.         OBJECTIVE:       Visit Vitals  /73 (BP Location: Right arm, Patient Position: Sitting)   Pulse 60   Resp 16   Ht 5' 6" (1.676 m)   Wt 68.5 kg (151 lb)   BMI 24.37 kg/m²        General:  Pleasant, Well-nourished, Well-groomed.    CV:  Neck is supple.  There are no carotid bruits.  Heart has regular rate and rhythm.    Lungs:  Lungs are clear to auscultation bilaterally with non-labored respirations.    Abdomen:  Soft, non-tender, non-distended.    Musculoskeletal:  Cervical ROM:  Moderately limited in all 4 spheres.  Neck pain is increased in all 4 directions.  Tinel's is negative at bilateral wrist and elbows.  Spurling's maneuver is negative bilaterally.  Lumbar ROM:  Full with flexion severely limited with extension.  Straight leg raise is negative bilaterally.  Crossed straight leg raise is negative bilaterally.  Hip rotation is negative bilaterally.    Neurological:    Alert and oriented to person, place, time, and situation.  Muscle strength against resistance:  Strength  Deltoids Triceps Biceps Wrist Extension Wrist Flexion Intrinsics   Upper: R 5/5 5/5 5/5 5/5  5/5    L 5/5 5/5 5/5 5/5  5/5     Iliopsoas Quadriceps Knee  Flexion Tibialis  anterior Gastro- cnemius EHL   Lower: R 5/5 5/5 5/5 5/5 5/5 5/5    L 5/5 5/5 5/5 5/5 5/5 5/5   Sensation is intact to " primary modalities in bilateral upper extremities.  Reflexes:   Right Left   Triceps 1+ 1+   Biceps 2+ 2+   Brachioradialis 2+ 2+   Patellar 2+ 2+   Achilles 0 0   Melgar's sign is negative bilaterally.  Toes are down going to Babinski.  There is no clonus at the ankles.  Gait is antalgic.   She was able to walk on heels and toes with little difficulty.      Imaging:  All pertinent neuroimaging independently reviewed. Discussed these findings in detail with the patient.  MRI of the cervical spine was obtained without contrast on 05/08/2024 and with contrast on 05/16/2024.  This study shows enhancing dural-based mass at C3-4 on the right.  There is no cord compression at this level.  There is disc/osteophyte complex along with uncinate and facet hypertrophy at C4-5 that causes moderate to severe right foraminal stenosis.  At C6-7, there is disc/osteophyte complex that causes severe right and moderate to severe left foraminal stenosis.  Cervical x-rays were obtained today, 06/05/2024.  This study shows disc space narrowing and spondylosis most significant at C5-6.  There is reverse lordosis centered at C4-5.  There is no abnormal motion with flexion or extension.  This is to my reading.      ASSESSMENT:     1. Meningioma, spinal          PLAN:     Options were discussed at length with the patient.  Per radiology report from in [address] [facility], the MRI on 06/05/2024 was compared to a prior scan from 12/12/2022.  I have asked the patient to obtain the MRI images from [facility] and bring it to our office for review.  Follow-up will be determined once we reviewed that scan.  The patient was discussed with Dr. Chaves.  We will follow the meningioma with serial imaging.      In addition, we discussed the importance of tobacco cessation.    A total of 33 minutes was spent face-to-face with the patient during this encounter.  Over half of that time was spent on counseling and coordination of care.  An additional 15+ minutes were  used to review the patient's chart including note from Dr. Morgan, cervical MRI and x-ray images and reports, discuss with Dr. Chaves, and work on office note.        Alexa Anguiano PA-C      Disclaimer:  This note is prepared using voice recognition software and as such is likely to have errors despite attempts at proofreading.       6/7/2024  Patient brought in her cervical MRI without contrast from 12/12/2022.  Dr. Chaves reviewed the imaging.  He would like the patient to return for follow-up with repeat MRI with and without contrast of the cervical spine 3 months after the last which will be mid August.  I called the patient to let her know.  She voiced understanding.

## 2024-06-21 ENCOUNTER — TELEPHONE (OUTPATIENT)
Dept: NEUROSURGERY | Facility: CLINIC | Age: 64
End: 2024-06-21
Payer: MEDICARE

## 2024-06-21 DIAGNOSIS — D32.1 MENINGIOMA, SPINAL: Primary | ICD-10-CM

## 2024-06-21 NOTE — TELEPHONE ENCOUNTER
I discussed the patient and her situation with Dr. Chaves.  The prior MRI of the cervical spine from [facility] was reviewed.  Meningioma is visualized on that scan.  He would like her to return for follow-up after repeating the scan at 3 months after the last scan with and without contrast.

## 2024-08-14 ENCOUNTER — OFFICE VISIT (OUTPATIENT)
Dept: NEUROSURGERY | Facility: CLINIC | Age: 64
End: 2024-08-14
Payer: MEDICARE

## 2024-08-14 VITALS
WEIGHT: 151 LBS | RESPIRATION RATE: 16 BRPM | BODY MASS INDEX: 24.27 KG/M2 | HEIGHT: 66 IN | SYSTOLIC BLOOD PRESSURE: 137 MMHG | HEART RATE: 59 BPM | DIASTOLIC BLOOD PRESSURE: 74 MMHG

## 2024-08-14 DIAGNOSIS — D32.1 MENINGIOMA, SPINAL: Primary | ICD-10-CM

## 2024-08-14 DIAGNOSIS — M47.22 CERVICAL SPONDYLOSIS WITH RADICULOPATHY: ICD-10-CM

## 2024-08-14 PROCEDURE — 99214 OFFICE O/P EST MOD 30 MIN: CPT | Mod: ,,, | Performed by: PHYSICIAN ASSISTANT

## 2024-08-14 NOTE — PROGRESS NOTES
[facility]  History & Physical  Neurosurgery      Kendra Hodges   [id number]   1960       SUBJECTIVE:     CHIEF COMPLAINT:  Neck pain      HPI:  Kendra Hodges is a 64 y.o. female who presents for follow up appointment.  The patient was last seen by me on 06/05/2024.  She is under the care of Dr. Morgan in regards to neck and lower back pain with radiculopathy.  MRI of the cervical spine was obtained on 5/8/2024 without contrast and 5/16/2024 with contrast.  This study revealed an enhancing dural-based mass on the right at C3-4.  She returns today with serial imaging to follow this lesion.    The patient continues with neck pain with pain radiating into the right trapezius region and shoulder.  At times, she has discomfort up into the right ear and jaw.  There is numbness and tingling in the left 1st, 2nd, and 3rd fingers as well as at the left elbow.  She does not awakens with numbness in the night or in the morning.  Subjectively, she does not feel she has weakness in either upper or lower extremities.  She has undergone trigger point injections with Dr. Morgan of the right trapezius region.  She had improvement in her symptoms for 3 weeks.  She was underwent a course of physical therapy over 1 year ago.  She did not find benefit of her symptoms with that treatment.      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     EDGAR (obstructive sleep apnea)     Osteoarthritis     RA (rheumatoid arthritis)     Systemic lupus erythematosus        Past Surgical History:   Procedure Laterality Date    CARPAL TUNNEL RELEASE Right     KNEE SURGERY Left     LEFT AXILLARY TO BRACHIAL BYPASS WITH LEFT LEG VEIN HARVEST Left 07/18/2023     OVARIAN CYST REMOVAL      SHOULDER SURGERY Right        Family History   Problem Relation Name Age of Onset    Hypertension Mother      Heart failure Mother      Hypertension Father      Diabetes Father      Parkinsonism Father         Social History     Socioeconomic History    Marital status:    Tobacco Use    Smoking status: Former     Types: Cigarettes    Smokeless tobacco: Never   Substance and Sexual Activity    Alcohol use: Not Currently    Drug use: Never     Social Determinants of Health     Financial Resource Strain: Medium Risk (6/27/2024)    [facility]    Overall Financial Resource Strain (CARDIA)     Difficulty of Paying Living Expenses: Somewhat hard   Food Insecurity: No Food Insecurity (6/27/2024)    Received from [facility] and Aff[facility]ates    Hunger Vital Sign     Worried About Running Out of Food in the Last Year: Never true     Ran Out of Food in the Last Year: Never true   Transportation Needs: No Transportation Needs (6/27/2024)    Received from [facility] and [facility]    PRAPARE - Transportation     Lack of Transportation (Medical): No     Lack of Transportation (Non-Medical): No   Physical Activity: Insufficiently Active (6/27/2024)    [facility]    Exercise Vital Sign     Days of Exercise per Week: 3 days     Minutes of Exercise per Session: 10 min   Stress: No Stress Concern Present (6/27/2024)    Received from [facility] and [facility]    [employer] - Occupational Stress Questionnaire     Feeling of Stress : Not at all   Housing Stability: Low Risk  (3/1/2023)    [facility]    Housing Stability Vital Sign     Unable to Pay for Housing in the Last Year: No     Number of Places Lived in the Last Year: 2     Unstable Housing in the Last Year: No       Review of patient's allergies indicates:   Allergen Reactions    Hydrochlorothiazide Hives and Shortness Of Breath    Pilocarpine Hives, Itching, Rash and Swelling    Shellfish containing products Dermatitis, Hives, Itching, Rash and Swelling    Sulfa (sulfonamide antibiotics) Hives, Itching and Rash        Current Outpatient Medications   Medication Instructions    albuterol (PROVENTIL/VENTOLIN HFA) 90 mcg/actuation inhaler 2 puffs, Inhalation, Every 6 hours PRN, Rescue    aspirin (ECOTRIN) 81 MG EC tablet 1 tablet, Oral, Every morning    atorvastatin (LIPITOR) 20 mg, Oral, Daily    budesonide-glycopyr-formoterol (BREZTRI AEROSPHERE) 160-9-4.8 mcg/actuation HFAA 2 puffs, Inhalation, 2 times daily    carvediloL (COREG) 12.5 mg, Oral, 2 times daily    ezetimibe (ZETIA) 10 mg, Oral    hydroxychloroquine (PLAQUENIL) 200 mg, Oral, 2 times daily    ibuprofen (ADVIL,MOTRIN) 600 MG tablet No dose, route, or frequency recorded.    losartan (COZAAR) 50 mg, Oral, Daily    mycophenolate (CELLCEPT) 1,000 mg, Oral, 2 times daily    pantoprazole (PROTONIX) 40 mg, Oral, Every morning    tiZANidine (ZANAFLEX) 4 mg, Oral, Every 8 hours PRN, FOR MUSCLE SPASM    traMADoL (ULTRAM) 50 mg, Oral, As needed (PRN)       Review of Systems   Constitutional:  Negative for chills and fever.   HENT:  Negative for nosebleeds and sore throat.    Eyes:  Negative for pain and visual disturbance.   Respiratory:  Negative for cough, chest tightness and shortness of breath.    Cardiovascular:  Negative for chest pain.   Gastrointestinal:  Negative for diarrhea, nausea and vomiting.   Genitourinary:  Negative for difficulty  "urinating, dysuria and hematuria.   Musculoskeletal:  Positive for back pain and neck pain. Negative for gait problem and myalgias.   Skin:  Negative for rash.   Neurological:  Positive for numbness. Negative for dizziness, facial asymmetry, weakness and headaches.   Psychiatric/Behavioral:  Negative for confusion and sleep disturbance. The patient is not nervous/anxious.        OBJECTIVE:       Visit Vitals  /74 (BP Location: Right arm, Patient Position: Sitting)   Pulse (!) 59   Resp 16   Ht 5' 6" (1.676 m)   Wt 68.5 kg (151 lb)   BMI 24.37 kg/m²        General:  Pleasant, Well-nourished, Well-groomed.    Lungs:  Breathing is quiet with non-labored respirations.    Musculoskeletal:  Cervical ROM:  Mildly limited with rotation, moderately limited with extension.  Tinel's is positive at the left elbow negative at bilateral wrists and right elbow.    Neurological:    Alert and oriented to person, place, time, and situation.  Muscle strength against resistance:  Strength  Deltoids Biceps Triceps Wrist Extension Wrist Flexion Intrinsics   Upper: R 5/5 5/5 5/5 5/5  5/5    L 5/5 5/5 5/5 5/5  5/5   Gait is normal.   Coordination is normal.      Imaging:  All pertinent neuroimaging independently reviewed. Discussed these findings in detail with the patient.  MRI of the cervical spine with and without contrast was obtained on 08/08/2024.  This study shows stable intradural, extramedullary enhancing lesion on the right at C3-4.  She also has disc space narrowing and spondylosis at C5-6 and C6-7.  At C5-6, there is right-greater-than-left severe foraminal stenosis secondary to uncinate and facet hypertrophy.  At C6-7, there is severe left foraminal stenosis secondary to uncinate and facet hypertrophy.      ASSESSMENT:     1. Meningioma, spinal  MRI Cervical Spine W WO Cont    Creatinine, Serum      2. Cervical spondylosis with radiculopathy          PLAN:     Options were discussed with the patient.  We will continue to " follow the meningioma located at the right C3-4 region with serial imaging.  Surgery is not indicated.  She has foraminal stenosis through the mid to lower cervical spine.  She will continue to work with Dr. Morgan.  There are no restrictions in regards to her care.  She was given precautions on what to watch out for symptomatically that she should contact our office if she experiences.  The patient voiced understanding.  All of her questions were answered.  We will have her return for follow-up in May of 2025 which will be 1 year where after discovery of the meningioma.      A total of 15 minutes was spent face-to-face with the patient during this encounter.  Over half of that time was spent on counseling and coordination of care.  An additional 10 minutes were used to review the patient's chart including cervical MRI imaging and report, compare with 2 prior cervical MRI images, and work on office note.      Alexa Anguiano PA-C      Disclaimer:  This note is prepared using voice recognition software and as such is likely to have errors despite attempts at proofreading.

## 2024-08-26 ENCOUNTER — OFFICE VISIT (OUTPATIENT)
Dept: PULMONOLOGY | Facility: CLINIC | Age: 64
End: 2024-08-26
Payer: MEDICARE

## 2024-08-26 ENCOUNTER — CLINICAL SUPPORT (OUTPATIENT)
Dept: PULMONOLOGY | Facility: CLINIC | Age: 64
End: 2024-08-26
Payer: MEDICARE

## 2024-08-26 VITALS
HEART RATE: 62 BPM | BODY MASS INDEX: 24.06 KG/M2 | DIASTOLIC BLOOD PRESSURE: 80 MMHG | HEIGHT: 66 IN | OXYGEN SATURATION: 98 % | SYSTOLIC BLOOD PRESSURE: 138 MMHG | WEIGHT: 149.69 LBS | RESPIRATION RATE: 16 BRPM

## 2024-08-26 DIAGNOSIS — G47.33 OSA (OBSTRUCTIVE SLEEP APNEA): Primary | ICD-10-CM

## 2024-08-26 DIAGNOSIS — J44.89 ASTHMA WITH COPD: ICD-10-CM

## 2024-08-26 DIAGNOSIS — J44.1 COPD EXACERBATION: ICD-10-CM

## 2024-08-26 DIAGNOSIS — G47.33 OSA (OBSTRUCTIVE SLEEP APNEA): ICD-10-CM

## 2024-08-26 LAB
BRPFT: ABNORMAL
FEF 25 75 LLN: 1.48
FEF 25 75 PRE REF: 54.4 %
FEF 25 75 REF: 2.87
FEV1 FVC LLN: 66
FEV1 FVC PRE REF: 93.7 %
FEV1 FVC REF: 79
FEV1 LLN: 1.91
FEV1 PRE REF: 78.3 %
FEV1 REF: 2.56
FVC LLN: 2.45
FVC PRE REF: 82.9 %
FVC REF: 3.28
PEF LLN: 4.61
PEF PRE REF: 57.1 %
PEF REF: 6.43
PRE FEF 25 75: 1.56 L/S (ref 1.48–4.27)
PRE FET 100: 8.32 SEC
PRE FEV1 FVC: 73.74 % (ref 66.27–89.26)
PRE FEV1: 2 L (ref 1.91–3.18)
PRE FVC: 2.72 L (ref 2.45–4.14)
PRE PEF: 3.67 L/S (ref 4.61–8.25)

## 2024-08-26 PROCEDURE — 94762 N-INVAS EAR/PLS OXIMTRY CONT: CPT | Mod: PBBFAC

## 2024-08-26 PROCEDURE — 99214 OFFICE O/P EST MOD 30 MIN: CPT | Mod: PBBFAC,27,25 | Performed by: NURSE PRACTITIONER

## 2024-08-26 PROCEDURE — 99211 OFF/OP EST MAY X REQ PHY/QHP: CPT | Mod: PBBFAC

## 2024-08-26 PROCEDURE — 99999 PR PBB SHADOW E&M-EST. PATIENT-LVL IV: CPT | Mod: PBBFAC,,, | Performed by: NURSE PRACTITIONER

## 2024-08-26 PROCEDURE — 99214 OFFICE O/P EST MOD 30 MIN: CPT | Mod: 25,S$PBB,, | Performed by: NURSE PRACTITIONER

## 2024-08-26 PROCEDURE — 94010 BREATHING CAPACITY TEST: CPT | Mod: 26,S$PBB,, | Performed by: INTERNAL MEDICINE

## 2024-08-26 PROCEDURE — 94010 BREATHING CAPACITY TEST: CPT | Mod: PBBFAC

## 2024-08-26 PROCEDURE — 99999PBSHW PR PBB SHADOW TECHNICAL ONLY FILED TO HB: Mod: PBBFAC,,,

## 2024-08-26 PROCEDURE — 99999 PR PBB SHADOW E&M-EST. PATIENT-LVL I: CPT | Mod: PBBFAC,,,

## 2024-08-26 RX ORDER — BUDESONIDE, GLYCOPYRROLATE, AND FORMOTEROL FUMARATE 160; 9; 4.8 UG/1; UG/1; UG/1
2 AEROSOL, METERED RESPIRATORY (INHALATION) 2 TIMES DAILY
Qty: 10.7 G | Refills: 11 | Status: SHIPPED | OUTPATIENT
Start: 2024-08-26

## 2024-08-26 RX ORDER — PREDNISONE 20 MG/1
TABLET ORAL
Qty: 21 TABLET | Refills: 0 | Status: SHIPPED | OUTPATIENT
Start: 2024-08-26

## 2024-08-26 RX ORDER — LEVALBUTEROL INHALATION SOLUTION 1.25 MG/3ML
SOLUTION RESPIRATORY (INHALATION)
COMMUNITY
Start: 2023-01-26 | End: 2024-08-27 | Stop reason: SDUPTHER

## 2024-08-26 NOTE — PROGRESS NOTES
"Subjective:      Patient ID: Kendra Hodges is a 64 y.o. female.    Chief Complaint: Sleep Apnea    HPI  Paitent with COPD and EDGAR.  Patient states improved symptoms with use of AutoPAP. Sleeping more soundly. Waking up feeling more refreshed. Improved daytime sleepiness. Patient states she is benefiting from use of the AutoPAP. Symbicort changed to Breztri. Taking Albuterol as needed. She is taking more often. She has wheezing.   Quit smoking 2020. She states she is smoking a cigarette at times but not often.   She states she has oxygen bled in her APAP- from when she lived in [place] at 3L    Patient Active Problem List   Diagnosis    Moderate obstructive sleep apnea    Asthma with COPD    Stopped smoking with greater than 30 pack year history     /80   Pulse 62   Resp 16   Ht 5' 6" (1.676 m)   Wt 67.9 kg (149 lb 11.1 oz)   SpO2 98%   BMI 24.16 kg/m²   Body mass index is 24.16 kg/m².    Review of Systems   Constitutional: Negative.    HENT: Negative.     Respiratory:  Positive for shortness of breath, wheezing and use of rescue inhaler.    Cardiovascular: Negative.    Musculoskeletal: Negative.    Gastrointestinal: Negative.    Neurological: Negative.    Psychiatric/Behavioral: Negative.       Objective:      Physical Exam  Constitutional:       Appearance: Normal appearance. She is well-developed.   HENT:      Head: Normocephalic and atraumatic.      Nose: Nose normal.   Cardiovascular:      Rate and Rhythm: Normal rate and regular rhythm.      Heart sounds: No murmur heard.     No gallop.   Pulmonary:      Effort: Pulmonary effort is normal.      Comments: Decreased breath sounds. Wheezing audible on forced expiration.   Abdominal:      Palpations: Abdomen is soft.      Tenderness: There is no abdominal tenderness.   Musculoskeletal:         General: Normal range of motion.      Cervical back: Normal range of motion and neck supple.   Skin:     General: Skin is warm and dry.   Neurological:      " Mental Status: She is alert and oriented to person, place, and time.   Psychiatric:         Mood and Affect: Mood normal.         Behavior: Behavior normal.       Personal Diagnostic Review       Spirometry reviewed. FEV1 78 % of predicted.    Autopap download:            8/26/2024    10:56 AM   EPWORTH SLEEPINESS SCALE   Sitting and reading 2   Watching TV 2   Sitting, inactive in a public place (e.g. a theatre or a meeting) 0   As a passenger in a car for an hour without a break 1   Lying down to rest in the afternoon when circumstances permit 3   Sitting and talking to someone 0   Sitting quietly after a lunch without alcohol 3   In a car, while stopped for a few minutes in traffic 0   Total score 11               Assessment:       1. EDGAR (obstructive sleep apnea)    2. Asthma with COPD    3. COPD exacerbation        Outpatient Encounter Medications as of 8/26/2024   Medication Sig Dispense Refill    albuterol (PROVENTIL/VENTOLIN HFA) 90 mcg/actuation inhaler Inhale 2 puffs into the lungs every 6 (six) hours as needed for Wheezing or Shortness of Breath. Rescue 18 g 5    atorvastatin (LIPITOR) 20 MG tablet Take 20 mg by mouth once daily.      carvediloL (COREG) 12.5 MG tablet Take 12.5 mg by mouth 2 (two) times daily.      ezetimibe (ZETIA) 10 mg tablet Take 10 mg by mouth.      hydrOXYchloroQUINE (PLAQUENIL) 200 mg tablet Take 200 mg by mouth 2 (two) times daily.      ibuprofen (ADVIL,MOTRIN) 600 MG tablet       levalbuterol (XOPENEX) 1.25 mg/3 mL nebulizer solution       losartan (COZAAR) 50 MG tablet Take 50 mg by mouth once daily.      mycophenolate (CELLCEPT) 500 mg Tab Take 1,000 mg by mouth 2 (two) times daily.      pantoprazole (PROTONIX) 40 MG tablet Take 40 mg by mouth every morning.      tiZANidine (ZANAFLEX) 4 MG tablet Take 4 mg by mouth every 8 (eight) hours as needed. FOR MUSCLE SPASM      traMADoL (ULTRAM) 50 mg tablet Take 50 mg by mouth as needed.      [DISCONTINUED]  budesonide-glycopyr-formoterol (BREZTRI AEROSPHERE) 160-9-4.8 mcg/actuation HFAA Inhale 2 puffs into the lungs 2 (two) times daily. 10.7 g 6    aspirin (ECOTRIN) 81 MG EC tablet Take 1 tablet by mouth every morning.      budesonide-glycopyr-formoterol (BREZTRI AEROSPHERE) 160-9-4.8 mcg/actuation HFAA Inhale 2 puffs into the lungs 2 (two) times daily. 10.7 g 11    predniSONE (DELTASONE) 20 MG tablet 3 tablets for 3 days. 2 tablets for 3 days. 1 tablet for 3 days. One half tablet for 3 days. 21 tablet 0    [DISCONTINUED] levalbuterol (XOPENEX) 1.25 mg/3 mL nebulizer solution Take 3 mLs (1.25 mg total) by nebulization every 4 (four) hours as needed for Wheezing or Shortness of Breath. 360 mL 3     No facility-administered encounter medications on file as of 8/26/2024.     Orders Placed This Encounter   Procedures    CPAP/BIPAP SUPPLIES     Order Specific Question:   Length of need (1-99 months):     Answer:   99     Order Specific Question:   Choose ONE mask type and its corresponding cushions and/or pillows:     Answer:    Full Face Mask, 1 per 90 days:  Full Face Cushion, (3 per 90 days)     Order Specific Question:   Choose EITHER Heated or Non-Heated Tubjing     Answer:    Non-Heated Tubing, 1 per 90 days     Order Specific Question:   All other supplies as needed as listed below:     Answer:    Headgear, 1 per 180 days     Order Specific Question:   All other supplies as needed as listed below:     Answer:    Disposable Filter, 6 per 90 days     Order Specific Question:   All other supplies as needed as listed below:     Answer:    Non-Disposable Filter, 1 per 180 days     Order Specific Question:   All other supplies as needed as listed below:     Answer:    Humidifier Chamber, 1 per 180 days    Stress test, pulmonary     Standing Status:   Future     Standing Expiration Date:   8/26/2025     Order Specific Question:   Reason for study     Answer:   Oxygen prescription     Order  Specific Question:   Release to patient     Answer:   Immediate    PULSE OXIMETRY OVERNIGHT     Standing Status:   Future     Number of Occurrences:   1     Standing Expiration Date:   8/26/2025     Order Specific Question:   Reason for Test?     Answer:   O2 Re-Qualification     Order Specific Question:   Symptoms:     Answer:   Snoring     Order Specific Question:   Oxygen Settings:     Answer:   off oxygen, on cpap     Order Specific Question:   BiPAP Settings:     Answer:   .     Order Specific Question:   CPAP Settings:     Answer:   autopap     Order Specific Question:   Room Air:     Answer:   No     Comments:   on CPAP, OFF oxygen     Plan:       1. EDGAR (obstructive sleep apnea)  -     CPAP/BIPAP SUPPLIES  -     PULSE OXIMETRY OVERNIGHT; Future    2. Asthma with COPD  -     budesonide-glycopyr-formoterol (BREZTRI AEROSPHERE) 160-9-4.8 mcg/actuation HFAA; Inhale 2 puffs into the lungs 2 (two) times daily.  Dispense: 10.7 g; Refill: 11  -     Stress test, pulmonary; Future    3. COPD exacerbation    Other orders  -     predniSONE (DELTASONE) 20 MG tablet; 3 tablets for 3 days. 2 tablets for 3 days. 1 tablet for 3 days. One half tablet for 3 days.  Dispense: 21 tablet; Refill: 0                        Elizabeth LeJeune, ACNP, ANP

## 2024-08-27 DIAGNOSIS — J44.9 CHRONIC OBSTRUCTIVE PULMONARY DISEASE, UNSPECIFIED COPD TYPE: Primary | ICD-10-CM

## 2024-08-27 RX ORDER — LEVALBUTEROL INHALATION SOLUTION 1.25 MG/3ML
1 SOLUTION RESPIRATORY (INHALATION) EVERY 6 HOURS PRN
Qty: 30 EACH | Refills: 11 | Status: SHIPPED | OUTPATIENT
Start: 2024-08-27 | End: 2024-08-27

## 2024-08-27 RX ORDER — LEVALBUTEROL INHALATION SOLUTION 1.25 MG/3ML
1 SOLUTION RESPIRATORY (INHALATION) EVERY 6 HOURS PRN
Qty: 30 EACH | Refills: 11 | Status: SHIPPED | OUTPATIENT
Start: 2024-08-27

## 2024-08-27 RX ORDER — LEVALBUTEROL INHALATION SOLUTION 1.25 MG/3ML
SOLUTION RESPIRATORY (INHALATION)
Status: CANCELLED | OUTPATIENT
Start: 2024-08-27

## 2024-08-27 NOTE — PROCEDURES
[facility]  [address] [address]. * [place] [address]  Telephone: [phone]  Test date: 24 Start: 24 11:29:26 Kendra smith  Doctor: DAVON Allen NP End: 24 06:44:38 [id number]  Oximetry: Summary Report  Comments: On CPAP, on Room air. Interpreting lavellecimilla Valero MD  Recording time: 19:15:12 Highest pulse: 162 Highest SpO2: 99%  Excluded samplin:05:16 Lowest pulse: 40 Lowest SpO2: 83%  Total valid samplin:09:56 Mean pulse: 59 Mean SpO2: 94.0%  1 S.D.: 4.6 1 S.D.: 1.4  Time with SpO2<90: 0:00:16, 0.1%  Time with SpO2<80: 0:00:00, 0.0%  Time with SpO2<70: 0:00:00, 0.0%  Time with SpO2<60: 0:00:00, 0.0%  Time with SpO2<89: 0:00:12, 0.0%  Time with SpO2 =>90: 7:09:40, 99.9%  Time with SpO2=>80 & <90: 0:00:16, 0.1%  Time with SpO2=>70 & <80: 0:00:00, 0.0%  Time with SpO2=>60 & <70: 0:00:00, 0.0%  The longest continuous time with saturation <=88 was 00:00:12, which started at  24 23:31:02.  A desaturation event was defined as a decrease of saturation by 4 or more.  No events were excluded due to artifact.  There were 10 desaturation events over 3 minutes duration.  There were 4 desaturation events of less than 3 minutes duration during which:  The mean high was 96.3%. The mean low was 91.5%.  The number of these events that were:  > 0 & <10 seconds: 0 > 0 seconds: 4  =>10 & <20 seconds: 1 =>10 seconds: 4  =>20 & <30 seconds: 3 =>20 seconds: 3  =>30 & <40 seconds: 0 =>30 seconds: 0  =>40 & <50 seconds: 0 =>40 seconds: 0  =>50 & <60 seconds: 0 =>50 seconds: 0  =>60 seconds: 0 =>60 seconds: 0  The mean length of desaturation events that were >=10 sec & <=3 mins was: 20.0 sec.  Desaturation event index (events >=10 sec per sampled hour): 0.6  Desaturation event index (events >= 0 sec per sampled hour): 0.6    OVERNIGHT OXIMETRY REPORT:    Dictated by: Dannie Valero MD  Test date: 2024  Dictated on: 2024      Comment: This test was performed  on CPAP and Room Air     A desaturation event was defined as a decrease of saturation by 4 or more.    REPORT SUMMARY  Total valid samplin:09:56   High SpO2: 99%    Low SpO2: 83%    Mean SpO2  94 %  Cumulative time with oxygen saturation less than 88% (TC88): 0:00:12    CLINICAL INTERPRETATION  Results are normal,  There is no significant nocturnal oxygen desaturation, and  Clinical correlation is advised    Medicare Criteria Comments:   Oximetry test results suggest the patient does not fall under Medicare Group 1 Criteria. ( Arterial oxygen saturation at or below 88% for at least 5 minutes taken during sleep)  Dannie Valero MD    Details about Medicare Group Criteria coverage can be found at http://www.cms.hhs.gov/manuals/downloads/

## 2024-09-09 DIAGNOSIS — J44.89 ASTHMA WITH COPD: ICD-10-CM

## 2024-09-09 RX ORDER — ALBUTEROL SULFATE 90 UG/1
2 INHALANT RESPIRATORY (INHALATION) EVERY 6 HOURS PRN
Refills: 5 | OUTPATIENT
Start: 2024-09-09

## 2024-09-09 NOTE — TELEPHONE ENCOUNTER
----- Message from Celine Root sent at 9/9/2024  2:23 PM CDT -----  Contact: self  [phone]  Type:  RX Refill Request    Who Called: Kendra  Refill or New Rx:refill  RX Name and Strength:albuterol (PROVENTIL/VENTOLIN HFA) 90 mcg/actuation inhaler  How is the patient currently taking it? (ex. 1XDay):2xday  Is this a 30 day or 90 day RX:30  Preferred Pharmacy with phone number:  [id number] - [place] - [address] [place] [address]  Phone: [phone] Fax: [phone]     Local or Mail Order:local  Ordering Provider:Wyatt  Would the patient rather a call back or a response via [facility]? Call back  Best Call Back Number:[phone]  Additional Information:

## 2024-09-16 DIAGNOSIS — J44.89 ASTHMA WITH COPD: ICD-10-CM

## 2024-09-16 RX ORDER — ALBUTEROL SULFATE 90 UG/1
2 INHALANT RESPIRATORY (INHALATION) EVERY 6 HOURS PRN
Qty: 18 G | Refills: 5 | Status: SHIPPED | OUTPATIENT
Start: 2024-09-16

## 2024-09-19 DIAGNOSIS — J44.89 ASTHMA WITH COPD: Primary | ICD-10-CM

## 2024-09-19 RX ORDER — PREDNISONE 20 MG/1
TABLET ORAL
Qty: 21 TABLET | Refills: 0 | OUTPATIENT
Start: 2024-09-19

## 2024-09-20 RX ORDER — PREDNISONE 20 MG/1
TABLET ORAL
Qty: 21 TABLET | Refills: 0 | Status: SHIPPED | OUTPATIENT
Start: 2024-09-20

## 2024-10-18 ENCOUNTER — TELEPHONE (OUTPATIENT)
Dept: PULMONOLOGY | Facility: CLINIC | Age: 64
End: 2024-10-18
Payer: MEDICARE

## 2024-10-18 NOTE — TELEPHONE ENCOUNTER
----- Message from Jonathan sent at 10/18/2024 12:23 PM CDT -----  Type: Needs Medical Advice  Who Called:  Sumeetangel from [employer] [facility] [employer]  Symptoms (please be specific):  said she need to speak to the nurse she faxed over chart that she need the chart notes on 10/1 for supply rx and needed her lasted chart notes--said she going to fax again today  Best Call Back Number: [phone] and fax number [phone]  Additional Information: thank you

## 2024-10-28 ENCOUNTER — PATIENT MESSAGE (OUTPATIENT)
Dept: GASTROENTEROLOGY | Facility: CLINIC | Age: 64
End: 2024-10-28
Payer: MEDICARE

## 2024-11-07 ENCOUNTER — OFFICE VISIT (OUTPATIENT)
Dept: PULMONOLOGY | Facility: CLINIC | Age: 64
End: 2024-11-07
Payer: MEDICARE

## 2024-11-07 DIAGNOSIS — J44.89 ASTHMA WITH COPD: Primary | ICD-10-CM

## 2024-11-07 NOTE — PROGRESS NOTES
Subjective:      Patient ID: Kendra Hodges is a 64 y.o. female.    Chief Complaint: COPD  The patient location is: [place]  The chief complaint leading to consultation is: nebulizer kit supplies  Visit type: Virtual visit with synchronous audio and video  Total time spent with patient: 12 min   Each patient to whom he or she provides medical services by telemedicine is:  (1) informed of the relationship between the physician and patient and the respective role of any other health care provider with respect to management of the patient; and (2) notified that he or she may decline to receive medical services by telemedicine and may withdraw from such care at any time.    HPI  Patient with COPD and obstructive sleep apnea.  She states she needs nebulizer supplies.  She takes Xopenex up to 4 times a day.  She uses her nebulizer on a regular basis.  At least twice a day.  She benefits from use.  She is compliant with Breztri inhaler. Quit smoking 2020. She states she is smoking a cigarette at times but not often.   She continues to wear her auto PAP and is compliant.  She does not require oxygen bled through her auto PAP on last overnight saturation study.    Patient Active Problem List   Diagnosis    Moderate obstructive sleep apnea    Asthma with COPD    Stopped smoking with greater than 30 pack year history       Assessment:       1. Asthma with COPD        Outpatient Encounter Medications as of 11/7/2024   Medication Sig Dispense Refill    albuterol (PROVENTIL/VENTOLIN HFA) 90 mcg/actuation inhaler Inhale 2 puffs into the lungs every 6 (six) hours as needed for Wheezing or Shortness of Breath. Rescue 18 g 5    aspirin (ECOTRIN) 81 MG EC tablet Take 1 tablet by mouth every morning.      atorvastatin (LIPITOR) 20 MG tablet Take 20 mg by mouth once daily.      budesonide-glycopyr-formoterol (BREZTRI AEROSPHERE) 160-9-4.8 mcg/actuation HFAA Inhale 2 puffs into the lungs 2 (two) times daily. 10.7 g 11    carvediloL  (COREG) 12.5 MG tablet Take 12.5 mg by mouth 2 (two) times daily.      ezetimibe (ZETIA) 10 mg tablet Take 10 mg by mouth.      hydrOXYchloroQUINE (PLAQUENIL) 200 mg tablet Take 200 mg by mouth 2 (two) times daily.      ibuprofen (ADVIL,MOTRIN) 600 MG tablet       levalbuterol (XOPENEX) 1.25 mg/3 mL nebulizer solution Take 3 mLs (1.25 mg total) by nebulization every 6 (six) hours as needed for Wheezing. DX COPD. J44.9. 30 each 11    losartan (COZAAR) 50 MG tablet Take 50 mg by mouth once daily.      mycophenolate (CELLCEPT) 500 mg Tab Take 1,000 mg by mouth 2 (two) times daily.      pantoprazole (PROTONIX) 40 MG tablet Take 40 mg by mouth every morning.      predniSONE (DELTASONE) 20 MG tablet 3 tablets for 3 days. 2 tablets for 3 days. 1 tablet for 3 days. One half tablet for 3 days. 21 tablet 0    tiZANidine (ZANAFLEX) 4 MG tablet Take 4 mg by mouth every 8 (eight) hours as needed. FOR MUSCLE SPASM      traMADoL (ULTRAM) 50 mg tablet Take 50 mg by mouth as needed.       No facility-administered encounter medications on file as of 11/7/2024.     No orders of the defined types were placed in this encounter.    Plan:     Order for nebulizer supply renewal.  Use 4 times a day.  Continue Breztri with spacer.  Screening CT scan scheduled for next month.  Follow-up in March for CPAP and COPD  1. Asthma with COPD                 I spent a total of 16 minutes on the day of the visit.  This includes face to face time and non-face to face time preparing to see the patient (eg, review of tests), obtaining and/or reviewing separately obtained history, documenting clinical information in the electronic or other health record, independently interpreting results and communicating results to the patient/family/caregiver, or care coordinator.        Elizabeth LeJeune, ACNP, ANP

## 2024-12-05 ENCOUNTER — HOSPITAL ENCOUNTER (OUTPATIENT)
Dept: RADIOLOGY | Facility: HOSPITAL | Age: 64
Discharge: HOME | End: 2024-12-05
Attending: HOSPITALIST
Payer: MEDICARE

## 2024-12-05 DIAGNOSIS — Z87.891 STOPPED SMOKING WITH GREATER THAN 30 PACK YEAR HISTORY: ICD-10-CM

## 2024-12-05 PROCEDURE — 71271 CT THORAX LUNG CANCER SCR C-: CPT | Mod: TC

## 2025-01-29 ENCOUNTER — TELEPHONE (OUTPATIENT)
Dept: PULMONOLOGY | Facility: CLINIC | Age: 65
End: 2025-01-29
Payer: MEDICARE

## 2025-01-29 NOTE — TELEPHONE ENCOUNTER
Initiated prior authorization request with patient's insurance for -Breztri Aerosphere 160-9-4.8MCG/ACT aerosol   prescription. Submitted online via [email or web address] (request key- Approved  decision -- PA case ID     PA Case ID #: [id number]

## 2025-02-11 ENCOUNTER — LAB REQUISITION (OUTPATIENT)
Dept: LAB | Facility: HOSPITAL | Age: 65
End: 2025-02-11
Payer: MEDICARE

## 2025-02-11 DIAGNOSIS — Z79.01 LONG TERM (CURRENT) USE OF ANTICOAGULANTS: ICD-10-CM

## 2025-02-11 DIAGNOSIS — M32.9 SYSTEMIC LUPUS ERYTHEMATOSUS, UNSPECIFIED: ICD-10-CM

## 2025-02-11 LAB
ALBUMIN SERPL-MCNC: 4 G/DL (ref 3.4–4.8)
ALP SERPL-CCNC: 75 UNIT/L (ref 40–150)
ALT SERPL-CCNC: 17 UNIT/L (ref 0–55)
AST SERPL-CCNC: 15 UNIT/L (ref 5–34)
BASOPHILS # BLD AUTO: 0.07 X10(3)/MCL
BASOPHILS NFR BLD AUTO: 0.7 %
BILIRUB DIRECT SERPL-MCNC: 0.2 MG/DL (ref 0–?)
BILIRUB INDIRECT SERPL-MCNC: 0.3 MG/DL (ref 0–0.8)
BILIRUB SERPL-MCNC: 0.5 MG/DL
CRP SERPL-MCNC: <1 MG/L
EOSINOPHIL # BLD AUTO: 0.27 X10(3)/MCL (ref 0–0.9)
EOSINOPHIL NFR BLD AUTO: 2.6 %
ERYTHROCYTE [DISTWIDTH] IN BLOOD BY AUTOMATED COUNT: 13.8 % (ref 11.5–17)
ERYTHROCYTE [SEDIMENTATION RATE] IN BLOOD: 6 MM/HR (ref 0–30)
HCT VFR BLD AUTO: 45.3 % (ref 37–47)
HGB BLD-MCNC: 14.7 G/DL (ref 12–16)
IMM GRANULOCYTES # BLD AUTO: 0.05 X10(3)/MCL (ref 0–0.04)
IMM GRANULOCYTES NFR BLD AUTO: 0.5 %
LYMPHOCYTES # BLD AUTO: 1.69 X10(3)/MCL (ref 0.6–4.6)
LYMPHOCYTES NFR BLD AUTO: 16.1 %
MCH RBC QN AUTO: 31.1 PG (ref 27–31)
MCHC RBC AUTO-ENTMCNC: 32.5 G/DL (ref 33–36)
MCV RBC AUTO: 96 FL (ref 80–94)
MONOCYTES # BLD AUTO: 0.97 X10(3)/MCL (ref 0.1–1.3)
MONOCYTES NFR BLD AUTO: 9.3 %
NEUTROPHILS # BLD AUTO: 7.43 X10(3)/MCL (ref 2.1–9.2)
NEUTROPHILS NFR BLD AUTO: 70.8 %
NRBC BLD AUTO-RTO: 0 %
PLATELET # BLD AUTO: 197 X10(3)/MCL (ref 130–400)
PMV BLD AUTO: 10 FL (ref 7.4–10.4)
PROT SERPL-MCNC: 6.3 GM/DL (ref 5.8–7.6)
RBC # BLD AUTO: 4.72 X10(6)/MCL (ref 4.2–5.4)
WBC # BLD AUTO: 10.48 X10(3)/MCL (ref 4.5–11.5)

## 2025-02-11 PROCEDURE — 85652 RBC SED RATE AUTOMATED: CPT | Performed by: INTERNAL MEDICINE

## 2025-02-11 PROCEDURE — 86140 C-REACTIVE PROTEIN: CPT | Performed by: INTERNAL MEDICINE

## 2025-02-11 PROCEDURE — 80076 HEPATIC FUNCTION PANEL: CPT | Performed by: INTERNAL MEDICINE

## 2025-02-11 PROCEDURE — 85025 COMPLETE CBC W/AUTO DIFF WBC: CPT | Performed by: INTERNAL MEDICINE

## 2025-02-13 LAB — PATH REV: NORMAL

## 2025-03-17 ENCOUNTER — CLINICAL SUPPORT (OUTPATIENT)
Dept: PULMONOLOGY | Facility: CLINIC | Age: 65
End: 2025-03-17
Payer: MEDICARE

## 2025-03-17 ENCOUNTER — OFFICE VISIT (OUTPATIENT)
Dept: PULMONOLOGY | Facility: CLINIC | Age: 65
End: 2025-03-17
Payer: MEDICARE

## 2025-03-17 VITALS — WEIGHT: 156.06 LBS | BODY MASS INDEX: 25.08 KG/M2 | HEIGHT: 66 IN

## 2025-03-17 VITALS
OXYGEN SATURATION: 98 % | HEIGHT: 66 IN | WEIGHT: 156.06 LBS | BODY MASS INDEX: 25.08 KG/M2 | RESPIRATION RATE: 17 BRPM | DIASTOLIC BLOOD PRESSURE: 60 MMHG | SYSTOLIC BLOOD PRESSURE: 125 MMHG | HEART RATE: 76 BPM

## 2025-03-17 DIAGNOSIS — J44.89 ASTHMA WITH COPD: ICD-10-CM

## 2025-03-17 DIAGNOSIS — Z87.891 STOPPED SMOKING WITH GREATER THAN 30 PACK YEAR HISTORY: Primary | ICD-10-CM

## 2025-03-17 DIAGNOSIS — G47.33 OSA (OBSTRUCTIVE SLEEP APNEA): ICD-10-CM

## 2025-03-17 DIAGNOSIS — G47.33 MODERATE OBSTRUCTIVE SLEEP APNEA: ICD-10-CM

## 2025-03-17 PROCEDURE — 99999 PR PBB SHADOW E&M-EST. PATIENT-LVL I: CPT | Mod: PBBFAC,,,

## 2025-03-17 PROCEDURE — 99211 OFF/OP EST MAY X REQ PHY/QHP: CPT | Mod: PBBFAC

## 2025-03-17 PROCEDURE — 94618 PULMONARY STRESS TESTING: CPT | Mod: PBBFAC

## 2025-03-17 PROCEDURE — 99215 OFFICE O/P EST HI 40 MIN: CPT | Mod: PBBFAC,27 | Performed by: NURSE PRACTITIONER

## 2025-03-17 PROCEDURE — 99214 OFFICE O/P EST MOD 30 MIN: CPT | Mod: 25,S$PBB,, | Performed by: NURSE PRACTITIONER

## 2025-03-17 PROCEDURE — 99999 PR PBB SHADOW E&M-EST. PATIENT-LVL V: CPT | Mod: PBBFAC,,, | Performed by: NURSE PRACTITIONER

## 2025-03-17 PROCEDURE — 94618 PULMONARY STRESS TESTING: CPT | Mod: 26,S$PBB,, | Performed by: INTERNAL MEDICINE

## 2025-03-17 RX ORDER — VALSARTAN 160 MG/1
320 TABLET ORAL DAILY
COMMUNITY

## 2025-03-17 NOTE — PROGRESS NOTES
"Subjective:      Patient ID: Kendra Hodges is a 65 y.o. female.    Chief Complaint: COPD, Asthma, and Sleep Apnea    HPI  Patient with COPD and obstructive sleep apnea.  She states she needs nebulizer supplies.  She uses her nebulizer on a regular basis.  At least twice a day-Xopenex.  She benefits from use.  She is compliant with Breztri inhaler. Quit smoking 2020.  She continues to wear her auto PAP and is compliant.  She does not require oxygen bled through her auto PAP on last overnight saturation study.   Screening CT in December    Problem List[1]  /60   Pulse 76   Resp 17   Ht 5' 6" (1.676 m)   Wt 70.8 kg (156 lb 1.4 oz)   SpO2 98%   BMI 25.19 kg/m²   Body mass index is 25.19 kg/m².    Review of Systems   Constitutional: Negative.    HENT: Negative.     Respiratory: Negative.     Cardiovascular: Negative.    Musculoskeletal: Negative.    Gastrointestinal: Negative.    Neurological: Negative.    Psychiatric/Behavioral: Negative.       Objective:      Physical Exam  Constitutional:       Appearance: Normal appearance. She is well-developed.   HENT:      Head: Normocephalic and atraumatic.      Nose: Nose normal.      Mouth/Throat:      Pharynx: Oropharynx is clear.   Cardiovascular:      Rate and Rhythm: Normal rate and regular rhythm.      Heart sounds: No murmur heard.     No gallop.   Pulmonary:      Effort: Pulmonary effort is normal.      Breath sounds: Normal breath sounds.   Abdominal:      Palpations: Abdomen is soft. There is no mass.      Tenderness: There is no abdominal tenderness.   Musculoskeletal:         General: Normal range of motion.      Cervical back: Normal range of motion and neck supple.   Skin:     General: Skin is warm and dry.   Neurological:      Mental Status: She is alert and oriented to person, place, and time.   Psychiatric:         Mood and Affect: Mood normal.         Behavior: Behavior normal.       Personal Diagnostic Review      8/26/2024    10:56 AM   GIANNA " SLEEPINESS SCALE   Sitting and reading 2   Watching TV 2   Sitting, inactive in a public place (e.g. a theatre or a meeting) 0   As a passenger in a car for an hour without a break 1   Lying down to rest in the afternoon when circumstances permit 3   Sitting and talking to someone 0   Sitting quietly after a lunch without alcohol 3   In a car, while stopped for a few minutes in traffic 0   Total score 11          Phase Oxygen Assessment Supplemental O2 Heart   Rate Blood Pressure Nick Dyspnea Scale Rating   Resting 97 % Room Air 68 bpm 130/59 0   Exercise             Minute             1 97 % Room Air 93 bpm       2 96 % Room Air 96 bpm       3 96 % Room Air 100 bpm       4 96 % Room Air 100 bpm       5 96 % Room Air 102 bpm       6  96 % Room Air 102 bpm 165/67 7-8   Recovery             Minute             1 97 % Room Air 86 bpm       2 98 % Room Air 78 bpm       3 98 % Room Air 77 bpm       4 98 % Room Air 76 bpm 125/60 3      Six Minute Walk Summary  6MWT Status: completed without stopping  Patient Reported: Dyspnea, Dizziness, Lightheadedness          Interpretation:  Did the patient stop or pause?: No  Total Time Walked (Calculated): 360 seconds  Final Partial Lap Distance (feet): 100 feet  Total Distance Meters (Calculated): 396.24 meters  Predicted Distance Meters (Calculated): 488.58 meters  Percentage of Predicted (Calculated): 81.1  Peak VO2 (Calculated): 15.87  Mets: 4.53  Has The Patient Had a Previous Six Minute Walk Test?: Yes    Assessment:       1. Stopped smoking with greater than 30 pack year history    2. Asthma with COPD    3. Moderate obstructive sleep apnea    4. EDGAR (obstructive sleep apnea)        Encounter Medications[2]  Orders Placed This Encounter   Procedures    CPAP/BIPAP SUPPLIES     Length of need (1-99 months)::   99     Choose ONE mask type and its corresponding cushions and/or pillows::    Nasal Mask, 1 per 90 days:  Nasal Cushions, (6 per 90 days):  Nasal Pillows,  (6 per 90 days)     Choose EITHER Heated or Non-Heated Tubjing:    Non-Heated Tubing, 1 per 90 days     All other supplies as needed as listed below::    Headgear, 1 per 180 days     All other supplies as needed as listed below::    Disposable Filter, 6 per 90 days     All other supplies as needed as listed below::    Non-Disposable Filter, 1 per 180 days     All other supplies as needed as listed below::    Humidifier Chamber, 1 per 180 days    CT Chest Lung Screening Low Dose     Standing Status:   Future     Expected Date:   12/1/2025     Expiration Date:   9/16/2026     Is there documentation of shared decision making for this lung screening exam?:   Yes     Is the patient a current smoker?:   No     How many years has the patient quit smoking?:   5     Does the patient have a 20-pack/year or greater smoke history?:   Yes     Is the patient between the ages 50-80 years old?:   Yes     Does the patient show any signs or symptoms of lung cancer?:   No     Is this the first (baseline) CT or an annual exam?:   Annual [2]     May the Radiologist modify the order per protocol to meet the clinical needs of the patient?:   Yes     Is this a low dose screening chest CT?:   Yes    Spirometry without Bronchodilator     Standing Status:   Future     Expiration Date:   3/17/2026     Release to patient:   Immediate     Plan:       1. Stopped smoking with greater than 30 pack year history  -     CT Chest Lung Screening Low Dose; Future; Expected date: 12/01/2025    2. Asthma with COPD  Overview:  Breztri, Xopenex.  Quit smoking 2020    Orders:  -     Spirometry without Bronchodilator; Future    3. Moderate obstructive sleep apnea  Overview:  Compliant with PAP and benefits from use. Follow up annually in the sleep clinic.        4. EDGAR (obstructive sleep apnea)  -     CPAP/BIPAP SUPPLIES                        Elizabeth LeJeune, ACNP, ANP         [1]   Patient Active Problem List  Diagnosis    Moderate  obstructive sleep apnea    Asthma with COPD    Stopped smoking with greater than 30 pack year history   [2]   Outpatient Encounter Medications as of 3/17/2025   Medication Sig Dispense Refill    albuterol (PROVENTIL/VENTOLIN HFA) 90 mcg/actuation inhaler Inhale 2 puffs into the lungs every 6 (six) hours as needed for Wheezing or Shortness of Breath. Rescue 18 g 5    atorvastatin (LIPITOR) 20 MG tablet Take 20 mg by mouth once daily.      budesonide-glycopyr-formoterol (BREZTRI AEROSPHERE) 160-9-4.8 mcg/actuation HFAA Inhale 2 puffs into the lungs 2 (two) times daily. 10.7 g 11    carvediloL (COREG) 12.5 MG tablet Take 12.5 mg by mouth 2 (two) times daily.      ezetimibe (ZETIA) 10 mg tablet Take 10 mg by mouth.      hydrOXYchloroQUINE (PLAQUENIL) 200 mg tablet Take 200 mg by mouth 2 (two) times daily.      ibuprofen (ADVIL,MOTRIN) 600 MG tablet       levalbuterol (XOPENEX) 1.25 mg/3 mL nebulizer solution Take 3 mLs (1.25 mg total) by nebulization every 6 (six) hours as needed for Wheezing. DX COPD. J44.9. 30 each 11    losartan (COZAAR) 50 MG tablet Take 50 mg by mouth once daily.      mycophenolate (CELLCEPT) 500 mg Tab Take 1,000 mg by mouth 2 (two) times daily.      pantoprazole (PROTONIX) 40 MG tablet Take 40 mg by mouth every morning.      tiZANidine (ZANAFLEX) 4 MG tablet Take 4 mg by mouth every 8 (eight) hours as needed. FOR MUSCLE SPASM      traMADoL (ULTRAM) 50 mg tablet Take 50 mg by mouth as needed.      valsartan (DIOVAN) 160 MG tablet Take 320 mg by mouth once daily.      aspirin (ECOTRIN) 81 MG EC tablet Take 1 tablet by mouth every morning.      [DISCONTINUED] predniSONE (DELTASONE) 20 MG tablet 3 tablets for 3 days. 2 tablets for 3 days. 1 tablet for 3 days. One half tablet for 3 days. 21 tablet 0     No facility-administered encounter medications on file as of 3/17/2025.

## 2025-03-17 NOTE — PROCEDURES
"[facility]  Six Minute Walk     SUMMARY     Ordering Provider: Lejeune, Elizabeth B, NP   Interpreting Provider: Lucas Julio MD  Performing nurse/tech/RT: VT, RT  Diagnosis:  (Asthma with COPD)  Height: 5' 6" (167.6 cm)  Weight: 70.8 kg (156 lb 1.4 oz)  BMI (Calculated): 25.2                Phase Oxygen Assessment Supplemental O2 Heart   Rate Blood Pressure Nick Dyspnea Scale Rating   Resting 97 % Room Air 68 bpm 130/59 0   Exercise        Minute        1 97 % Room Air 93 bpm     2 96 % Room Air 96 bpm     3 96 % Room Air 100 bpm     4 96 % Room Air 100 bpm     5 96 % Room Air 102 bpm     6  96 % Room Air 102 bpm 165/67 7-8   Recovery        Minute        1 97 % Room Air 86 bpm     2 98 % Room Air 78 bpm     3 98 % Room Air 77 bpm     4 98 % Room Air 76 bpm 125/60 3     Six Minute Walk Summary  6MWT Status: completed without stopping  Patient Reported: Dyspnea, Dizziness, Lightheadedness     Interpretation:  Did the patient stop or pause?: No                                         Total Time Walked (Calculated): 360 seconds  Final Partial Lap Distance (feet): 100 feet  Total Distance Meters (Calculated): 396.24 meters  Predicted Distance Meters (Calculated): 488.58 meters  Percentage of Predicted (Calculated): 81.1  Peak VO2 (Calculated): 15.87  Mets: 4.53  Has The Patient Had a Previous Six Minute Walk Test?: Yes       Previous 6MWT Results  Has The Patient Had a Previous Six Minute Walk Test?: Yes  Date of Previous Test: 12/07/22  Total Time Walked: 360 seconds  Total Distance (meters): 396.24  Predicted Distance (meters): 504.78 meters  Percentage of Predicted: 78.5  Percent Change (Calculated): 0    Interpretation:  Total distance walked in six minutes is normal indicating normal functional capacity.   Patient did not meet criteria for oxygen prescription. Clinical correlation suggested.    [] Mild exercise-induced hypoxemia described as an arterial oxygen saturation of 93-95% (or " 3-4% less than at rest),  [] Moderate exercise-induced hypoxemia as 89-93%  [] Severe exercise induced hypoxemia as < 89% O2 saturation.  Medicare Criteria for oxygen prescription comments:   When arterial oxygen saturation is at or below 88% during exercise on room air (severe exercise induced hypoxemia) then the patient falls under Medicare Group 1 criteria for supplemental oxygen prescription.  Details about Medicare Group Criteria coverage can be found at http://www.cms.[facility].gov/manuals/downloads/     Lucas Julio MD

## 2025-04-14 ENCOUNTER — TELEPHONE (OUTPATIENT)
Dept: NEUROSURGERY | Facility: CLINIC | Age: 65
End: 2025-04-14
Payer: MEDICARE

## 2025-04-14 NOTE — TELEPHONE ENCOUNTER
----- Message from Med Assistant Bishop sent at 3/7/2025 12:01 PM CST -----  Schedule patient for 1 yr f/u w/ MRI (C) around 05/08/2025 w/ MM. (MRI to be completed at [facility])

## 2025-04-14 NOTE — TELEPHONE ENCOUNTER
I spoke with patient regarding her appt needing to be scheduled, but she requested it be pushed out approx until the middle of June due to the fact that she will be having cervical sx with Dr. Dasilva at [facility] on 3-4 levels underneath where her meningioma is located. I scheduled her for 06/11 with MM and her MRI to be completed at [facility] the week prior for 06/04. She was compliant with these appt dates.

## 2025-06-11 ENCOUNTER — OFFICE VISIT (OUTPATIENT)
Dept: NEUROSURGERY | Facility: CLINIC | Age: 65
End: 2025-06-11
Payer: MEDICARE

## 2025-06-11 VITALS
WEIGHT: 163 LBS | RESPIRATION RATE: 16 BRPM | HEIGHT: 66 IN | SYSTOLIC BLOOD PRESSURE: 169 MMHG | BODY MASS INDEX: 26.2 KG/M2 | DIASTOLIC BLOOD PRESSURE: 82 MMHG | HEART RATE: 56 BPM

## 2025-06-11 DIAGNOSIS — D32.1 MENINGIOMA, SPINAL: Primary | ICD-10-CM

## 2025-06-11 DIAGNOSIS — Z98.1 STATUS POST CERVICAL SPINAL FUSION: ICD-10-CM

## 2025-06-11 PROCEDURE — 99213 OFFICE O/P EST LOW 20 MIN: CPT | Mod: ,,, | Performed by: PHYSICIAN ASSISTANT

## 2025-06-11 RX ORDER — CYCLOBENZAPRINE HCL 10 MG
10 TABLET ORAL
COMMUNITY
Start: 2025-05-07

## 2025-06-11 RX ORDER — CLOBETASOL PROPIONATE 0.5 MG/G
CREAM TOPICAL
COMMUNITY
Start: 2025-03-19

## 2025-06-11 RX ORDER — CLONIDINE HYDROCHLORIDE 0.1 MG/1
0.1 TABLET ORAL 3 TIMES DAILY
COMMUNITY
Start: 2025-03-26

## 2025-06-11 NOTE — PROGRESS NOTES
[facility]  History & Physical  Neurosurgery      Kendra Hodges   [id number]   1960       SUBJECTIVE:     CHIEF COMPLAINT:  Neck pain      HPI:  Kendra Hodges is a 65 y.o. female who presents for follow up appointment.  She was last seen by me on 08/14/2024 in regards to a meningioma found on the right at C3-4.  She is under the care of Dr. Mauricio Hatfield in regards to neck and lower back pain with radiculopathy.  Her history is also significant for having undergone C4-5, C5-6, and C6-7 ACDF on 05/06/2025 with Dr. Dasilva.  There has been resolution of the numbness she was experiencing in her left hand.  Her radicular symptoms have improved as well.  She continues with right-sided neck pain and stiffness.  She no longer has pain radiating up the right side of her neck to the back of her head and jaw    Her blood pressure is elevated today in clinic.  She reports she had to go to the emergency department due to elevated blood pressure not long ago.  That was on 03/25/2025.  She contacted Dr. Nettles' office at that time.  She is to follow up with him next month.      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  Dr. Herbie Dasilva    CARPAL TUNNEL RELEASE Right     KNEE SURGERY Left     LEFT AXILLARY TO BRACHIAL BYPASS WITH LEFT LEG VEIN HARVEST Left 07/18/2023    OVARIAN CYST REMOVAL      SHOULDER SURGERY Right        Family History   Problem Relation Name Age of Onset     Hypertension Mother      Heart failure Mother      Hypertension Father      Diabetes Father      Parkinsonism Father         Social History     Socioeconomic History    Marital status:    Tobacco Use    Smoking status: Former     Types: Cigarettes    Smokeless tobacco: Never   Substance and Sexual Activity    Alcohol use: Not Currently    Drug use: Never     Social Drivers of Health     Financial Resource Strain: Low Risk  (6/5/2025)    Received from [facility] and Its Subsidiaries and Affiliates    Overall Financial Resource Strain (CARDIA)     Difficulty of Paying Living Expenses: Not very hard   Food Insecurity: Food Insecurity Present (6/5/2025)    Received from [facility] Its Subsidiaries and Affiliates    Hunger Vital Sign     Worried About Running Out of Food in the Last Year: Never true     Ran Out of Food in the Last Year: Sometimes true   Transportation Needs: No Transportation Needs (6/5/2025)    [facility]    PRAPARE - Transportation     Lack of Transportation (Medical): No     Lack of Transportation (Non-Medical): No   Physical Activity: Inactive (6/5/2025)    [facility]    Exercise Vital Sign     Days of Exercise per Week: 0 days     Minutes of Exercise per Session: 20 min   Stress: No Stress Concern Present (6/5/2025)    Received from [facility] and Its Subsidiaries and Affiliates    [employer] [place] [place] [employer] - Occupational Stress Questionnaire     Feeling of Stress : Not at all   Housing Stability: Low Risk  (6/5/2025)    Received from [facility] and Its Subsidiaries and Affiliates    Housing Stability Vital Sign     Unable to Pay for Housing in  the Last Year: No     Number of Times Moved in the Last Year: 0     Homeless in the Last Year: No       Review of patient's allergies indicates:   Allergen Reactions    Hydrochlorothiazide Hives and Shortness Of Breath    Pilocarpine Hives, Itching, Rash and Swelling    Shellfish containing products Dermatitis, Hives, Itching, Rash and Swelling    Latex, natural rubber Blisters and Hives    Sulfa (sulfonamide antibiotics) Hives, Itching and Rash        Current Outpatient Medications   Medication Instructions    albuterol (PROVENTIL/VENTOLIN HFA) 90 mcg/actuation inhaler 2 puffs, Inhalation, Every 6 hours PRN, Rescue    aspirin (ECOTRIN) 81 MG EC tablet 1 tablet, Weekly    budesonide-glycopyr-formoterol (BREZTRI AEROSPHERE) 160-9-4.8 mcg/actuation HFAA 2 puffs, Inhalation, 2 times daily    carvediloL (COREG) 12.5 mg, 2 times daily    clobetasoL (TEMOVATE) 0.05 % cream As needed (PRN)    cloNIDine (CATAPRES) 0.1 mg, 3 times daily    cyclobenzaprine (FLEXERIL) 10 mg, As needed (PRN)    ezetimibe (ZETIA) 10 mg    hydroxychloroquine (PLAQUENIL) 200 mg, 2 times daily    levalbuterol (XOPENEX) 1.25 mg, Nebulization, Every 6 hours PRN, DX COPD. J44.9.    pantoprazole (PROTONIX) 40 mg, Every morning    tiZANidine (ZANAFLEX) 4 mg, Every 8 hours PRN    traMADoL (ULTRAM) 50 mg, As needed (PRN)        Review of Systems   Constitutional:  Negative for chills and fever.   HENT:  Negative for nosebleeds and sore throat.    Eyes:  Negative for pain and visual disturbance.   Respiratory:  Negative for cough, chest tightness and shortness of breath.    Cardiovascular:  Negative for chest pain.   Gastrointestinal:  Negative for diarrhea, nausea and vomiting.   Genitourinary:  Negative for difficulty urinating, dysuria and hematuria.   Musculoskeletal:  Positive for neck pain and neck stiffness. Negative for gait problem and myalgias.   Skin:  Negative for rash.   Neurological:  Positive for numbness and headaches. Negative for  "dizziness, facial asymmetry and weakness.   Psychiatric/Behavioral:  Negative for confusion and sleep disturbance. The patient is not nervous/anxious.         OBJECTIVE:       Visit Vitals  BP (!) 169/82 (BP Location: Left arm, Patient Position: Sitting)   Pulse (!) 56   Resp 16   Ht 5' 6" (1.676 m)   Wt 73.9 kg (163 lb)   BMI 26.31 kg/m²        General:  Pleasant, Well-nourished, Well-groomed.    Lungs:  Breathing is quiet with non-labored respirations.    Neurological:    Alert and oriented to person, place, time, and situation.  Her memory, cognition, and affect are appropriate.  Speech is fluent.  She is moving all extremities well.  Gait is normal.       Imaging:  All pertinent neuroimaging independently reviewed. Discussed these findings in detail with the patient.  MRI of the cervical spine with and without contrast was obtained on 06/04/2025.  This study shows stable right-sided dural-based mass at C3-4.  This is compared to MRI of the cervical spine from 08/08/2024 and 05/16/2024.  We do have a MRI of the cervical spine without contrast from 12/12/2022.  The radiologist reported the meningioma to be unchanged in comparison an MRI of the cervical spine without contrast from 05/08/2024.    ASSESSMENT:     1. Meningioma, spinal        2. Status post cervical spinal fusion          PLAN:     The patient is a 65-year-old female who was found to have a right-sided dural-based mass at C3-4 consistent with meningioma when she was worked up for cervical radiculopathy.  She presents today with serial imaging.  She has undergone C4-5, C5-6, and C6-7 ACDF on 05/06/2025 with Dr. Dasilva.  There has been improvement in her symptoms.  She remains asymptomatic in regards to the meningioma.  MRI from 06/04/2025 shows the meningioma to be stable.  We will plan tentatively for the patient to return for follow-up in 1 year with repeat MRI of the cervical spine with and without contrast.  I will discuss the case with Dr." Anastacio.    In addition, the patient's blood pressure is elevated today.  She reports to have recently been treated in the emergency department due to elevated blood pressure.  She contacted Dr. Nettles office at that time.  She was given recommendations.  She has an appointment to see Dr. Nettles in the next month.  I have advised her to call and let them know that her blood pressure remains elevated.  She voiced understanding.      A total of 22 minutes was spent face-to-face with the patient during this encounter.  Over half of that time was spent on counseling and coordination of care.  An additional 15+ minutes were used to reviewed the patient's chart including cervical MRI images and report, compare with 3 prior cervical MRI images and report, and work on office note.      Alexa Anguiano PA-C      Disclaimer:  This note is prepared using voice recognition software and as such is likely to have errors despite attempts at proofreading.

## 2025-06-20 ENCOUNTER — TELEPHONE (OUTPATIENT)
Dept: NEUROSURGERY | Facility: CLINIC | Age: 65
End: 2025-06-20
Payer: MEDICARE

## 2025-06-20 DIAGNOSIS — D32.1 MENINGIOMA, SPINAL: Primary | ICD-10-CM

## 2025-06-20 NOTE — TELEPHONE ENCOUNTER
Discussed the patient and her situation with Dr. Chaves.  He reviewed her imaging studies.  He would like her to return for follow-up in 1 year with MRI of the cervical spine with and without contrast.  This will be 3 years post discovery.  I called the patient and discussed the plan.  She voiced understanding.    Edna.  Please schedule the MRI the first week in June with follow up with me after.  Thanks

## 2025-07-23 DIAGNOSIS — J44.89 ASTHMA WITH COPD: Primary | ICD-10-CM

## 2025-07-23 RX ORDER — FLUTICASONE FUROATE, UMECLIDINIUM BROMIDE AND VILANTEROL TRIFENATATE 100; 62.5; 25 UG/1; UG/1; UG/1
1 POWDER RESPIRATORY (INHALATION) DAILY
Qty: 1 EACH | Refills: 11 | Status: SHIPPED | OUTPATIENT
Start: 2025-07-23